# Patient Record
Sex: MALE | Race: WHITE | Employment: FULL TIME | URBAN - METROPOLITAN AREA
[De-identification: names, ages, dates, MRNs, and addresses within clinical notes are randomized per-mention and may not be internally consistent; named-entity substitution may affect disease eponyms.]

---

## 2017-01-04 ENCOUNTER — THERAPY VISIT (OUTPATIENT)
Dept: PHYSICAL THERAPY | Facility: CLINIC | Age: 47
End: 2017-01-04
Payer: COMMERCIAL

## 2017-01-04 DIAGNOSIS — M54.42 ACUTE LEFT-SIDED LOW BACK PAIN WITH LEFT-SIDED SCIATICA: Primary | ICD-10-CM

## 2017-01-04 PROCEDURE — 97110 THERAPEUTIC EXERCISES: CPT | Mod: GP | Performed by: PHYSICAL THERAPIST

## 2017-01-04 PROCEDURE — 97162 PT EVAL MOD COMPLEX 30 MIN: CPT | Mod: GP | Performed by: PHYSICAL THERAPIST

## 2017-01-04 PROCEDURE — 97112 NEUROMUSCULAR REEDUCATION: CPT | Mod: GP | Performed by: PHYSICAL THERAPIST

## 2017-01-04 ASSESSMENT — ACTIVITIES OF DAILY LIVING (ADL)
GOING_DOWN_1_FLIGHT_OF_STAIRS: SLIGHT DIFFICULTY
STEPPING_UP_AND_DOWN_CURBS: NO DIFFICULTY AT ALL
ROLLING_OVER_IN_BED: SLIGHT DIFFICULTY
HOS_ADL_ITEM_SCORE_TOTAL: 28
HOW_WOULD_YOU_RATE_YOUR_CURRENT_LEVEL_OF_FUNCTION_DURING_YOUR_USUAL_ACTIVITIES_OF_DAILY_LIVING_FROM_0_TO_100_WITH_100_BEING_YOUR_LEVEL_OF_FUNCTION_PRIOR_TO_YOUR_HIP_PROBLEM_AND_0_BEING_THE_INABILITY_TO_PERFORM_ANY_OF_YOUR_USUAL_DAILY_ACTIVITIES?: 40
PUTTING_ON_SOCKS_AND_SHOES: MODERATE DIFFICULTY
TWISTING/PIVOTING_ON_INVOLVED_LEG: MODERATE DIFFICULTY
GETTING_INTO_AND_OUT_OF_AN_AVERAGE_CAR: SLIGHT DIFFICULTY
WALKING_INITIALLY: SLIGHT DIFFICULTY
HOS_ADL_HIGHEST_POTENTIAL_SCORE: 40
STANDING_FOR_15_MINUTES: SLIGHT DIFFICULTY
RECREATIONAL_ACTIVITIES: EXTREME DIFFICULTY
LIGHT_TO_MODERATE_WORK: SLIGHT DIFFICULTY
SITTING_FOR_15_MINUTES: SLIGHT DIFFICULTY
HOS_ADL_COUNT: 10
GOING_UP_1_FLIGHT_OF_STAIRS: SLIGHT DIFFICULTY

## 2017-01-04 NOTE — PROGRESS NOTES
[facility] Initial Evaluation -- Lumbar    Date: January 4, 2017  Mukul Ascencio is a 46 year old male with a LB and L LE condition.   Referral: spine   Work Mechanical Stresses:  sitting  Employment Status:  Manager  Leisure Mechanical Stresses: normal daily activities, usually walks and lifts weights but has stopped since onset of pain  Functional disability score (ZULEMA/STarT Back):  50%/6  Visual Analog Score (VAS 0-10): 4/10  He is a formal exerciser    HISTORY:    Present symptoms: L LB, buttock, calf pain; numbness L lateral calf and foot into great toe  Pain quality (sharp/shooting/stabbing/aching/burning/cramping):  Sharp, aching   Paresthesia (yes/no):  Yes, R calf and big toe    Present since: 12/26/2016--noticed L LB soreness and tightness, woke with severe pain on 12/30/2016 extending into the L hip and LE to the calf, numbness to the big toe--had done the abdominal machine at the club the day before and wonders if it contributed.  MD gave him a steroid dose-pack which he finished today which may be helping but difficult to tell.  He has missed 1 day of work due to pain--has not been at work much due to other reasons but feels like he could get back to work now and make it through.  Symptoms (improving/unchanging/worsening):  improving.   Symptoms commenced as a result of: unknown   Condition occurred in the following environment:   home     Symptoms at onset (back/thigh/leg): L LB/buttock  Constant symptoms (back/thigh/leg): L calf and great toe numbness, L LB/buttock, thigh, and calf pain  Intermittent symptoms (back/thigh/leg): none    (With consideration for bending, sitting/rising, standing, walking, lying, am/as the day progresses/pm, when still/on the move)  Symptoms are made worse with the following: sitting 30 minutes, walking 1 mile, standing 30 minutes, bending, driving, lifting, dressing  Symptoms are made better with the following: lying on back with knees bent  up    Disturbed sleep (yes/no):  yes Sleeping postures (prone/sup/side R/L): supine    Previous episodes (0/1-5/6-10/11+): 1 Year of first episode: 2016    Previous history: episode of R LB and LE pain 05/2016  Previous treatments: PT for episode involving R side--nearly resolved       Specific Questions:  Cough/Sneeze/Strain (pos/neg): pos  Bowel/Bladder (normal/abnormal): normal  Gait (normal/abnormal): normal  Medications (nil/NSAIDS/analg/steroids/anticoag/other):  Steroids, anti-inflammatories, muscle relaxants, diabetes meds  Medical allergies:  penicillin  General Health (excellent/good/fair/poor):  good  Pertinent medical history:  Diabetes, sleep disorder/apnea  Imaging (NA/Xray/MRI):  MRI--central and L-sided disc protrusion L5-S1, interformainal disc protrusion L5-S1  Recent or major surgery (yes/no):  no  Night pain (yes/no): no  Accidents (yes/no): no  Unexplained weight loss (yes/no): no  Barriers at home: no  Other red flags: L DF weakness, great toe weakness    EXAMINATION    Posture:   Sitting (good/fair/poor): fair  Standing (good/fair/poor):good  Lordosis (red/acc/normal): red  Correction of posture (better/worse/no effect): increased L LBP/buttock pain, NE L calf    Lateral Shift (right/left/nil): nil  Relevant (yes/no):  na  Other Observations: na    Neurological:    Motor deficit:  L DF 4+/5, L great toe extension 3-/5  Reflexes:  Normal quad, absent Achilles on L  Sensory deficit:  Decreased L L4 and L5 light touch  Dural signs:  Positive L slump    Movement Loss:   Fer Mod Min Nil Pain   Flexion   x  x   Extension  x   x   Side Gliding R   x  NE   Side Gliding L  x   x     Test Movements:   During: produces, abolishes, increases, decreases, no effect, centralizing, peripheralizing   After: better, worse, no better, no worse, no effect, centralized, peripheralized    Pre-test Symptoms Standing:    Symptoms During Symptoms After ROM increased ROM decreased No Effect   FIS        Rep FIS         EIS        Rep EIS        Pre-test Symptoms Lying: L LB/buttock pain 4/10, L calf and great toe numbness    Symptoms During Symptoms After ROM increased ROM decreased No Effect   AUSTIN No Effect No Effect   x   Rep AUSTIN Decreases L LB/buttock No Better, decreased L ankle DF and great toe extension initially but returned to baseline after 30-60 seconds so no effect   x   EIL Increases No Worse   x   Rep EIL Decreases L LB/buttock (start over 2 pillows progressed to 0)  NE L calf or toe Better LB/buttock and  NE L calf or toe   x     If required Pre-test Symptoms: L LB/buttock pain 3/10, L calf and great toe numbness    Symptoms During Symptoms After ROM increased ROM decreased No Effect   SGIS - R        Rep SGIS - R        SGIS - L Increases No Worse   x   Rep SGIS - L Increases Better x       Static Tests:  Sitting slouched:    Sitting erect:    Standing slouched   Standing erect:    Lying prone in extension:   Long sitting:      Other Tests: improved L ankle AROM DF/strength after repeated L SGIS    Provisional Classification:  Derangement - Asymmetrical, unilateral, symptoms below knee, with relevant lateral component and Inconclusive/Other - Mechanically Unresponsive Radiculopathy    Principle of Management:  Education:  Posture--use of lumbar roll in sitting   Equipment provided:  Lumbar roll purchased  Mechanical therapy (Y/N):  y   Extension principle:    Lateral Principle:  L SGIS x10-20 reps, every 2 hours  Flexion principle:    Other:      ASSESSMENT/PLAN:    Patient is a 46 year old male with lumbar and L LE complaints.  Provisional classification of derangement with lateral component and assessing response to sidegliding exercises.  He had improved ROM and decreased L LB/buttock pain after L SGIS and also had slight improvement in L DF strength.  Trial of repeated lumbar extension in lying resulted in decreased L LB and buttock symptoms, no effect L calf or great toe, but improved lumbar flexion and  extension ROM.  Trial of repeated lumbar flexion in lying decreased L LB/buttock during, no effect L calf and great toe, NB and NE after, no change in ROM.  Treatment will focus on directional preference exercises and will require re-assessment to determine preference based on symptom response.  Will need to closely monitor L LE weakness for progression and referral back to MD if needed.   Patient has the following significant findings with corresponding treatment plan.                Diagnosis 1:  L LBP w/radiculopathy to foot  Pain -  self management, education, directional preference exercise and home program  Decreased ROM/flexibility - manual therapy, therapeutic exercise and home program  Decreased strength - therapeutic exercise, therapeutic activities and home program  Decreased function - therapeutic activities and home program  Impaired posture - neuro re-education and home program    Therapy Evaluation Codes:   1) History comprised of:   Personal factors that impact the plan of care:      None.    Comorbidity factors that impact the plan of care are:      Diabetes and Sleep disorder/apnea.     Medications impacting care: Anti-inflammatory, Muscle relaxant, Pain and Steroids.  2) Examination of Body Systems comprised of:   Body structures and functions that impact the plan of care:      Lumbar spine.   Activity limitations that impact the plan of care are:      Bending, Driving, Dressing, Lifting, Sitting, Standing and Walking.  3) Clinical presentation characteristics are:   Evolving/Changing.  4) Decision-Making    Moderate complexity using standardized patient assessment instrument and/or measureable assessment of functional outcome.  Cumulative Therapy Evaluation is: Moderate complexity.    Previous and current functional limitations:  (See Goal Flow Sheet for this information)    Short term and Long term goals: (See Goal Flow Sheet for this information)     Communication ability:  Patient appears to  be able to clearly communicate and understand verbal and written communication and follow directions correctly.  Treatment Explanation - The following has been discussed with the patient:   RX ordered/plan of care  Anticipated outcomes  Possible risks and side effects  This patient would benefit from PT intervention to resume normal activities.   Rehab potential is good.    Frequency:  2 X week, once daily  Duration:  for 2 weeks tapering to 1 X a week over 4 weeks  Discharge Plan:  Achieve all LTG.  Independent in home treatment program.  Reach maximal therapeutic benefit.    Please refer to the daily flowsheet for treatment today, total treatment time and time spent performing 1:1 timed codes.

## 2017-01-04 NOTE — Clinical Note
[facility] PHYSICAL THERAPY  [address] [place] [id number]  [phone]  2017  Re: Mukul Ascencio   :   1970  MRN:  [id number]   REFERRING PHYSICIAN:   Germain Santiago    [facility] PHYSICAL [facility]  Date of Initial Evaluation:  2017  Visits:  Rxs Used: 1  Reason for Referral:  Acute left-sided low back pain with left-sided sciatica    EVALUATION SUMMARY  [facility] Initial Evaluation -- Lumbar  Date: 2017  Mukul Ascencio is a 46 year old male with a LB and L LE condition.   Referral: spine   Work Mechanical Stresses:  sitting  Employment Status:  Manager  Leisure Mechanical Stresses: normal daily activities, usually walks and lifts weights but has stopped since onset of pain  Functional disability score (ZULEMA/STarT Back):  50%/6  Visual Analog Score (VAS 0-10): 4/10  He is a formal exerciser    HISTORY:  Present symptoms: L LB, buttock, calf pain; numbness L lateral calf and foot into great toe  Pain quality (sharp/shooting/stabbing/aching/burning/cramping):  Sharp, aching   Paresthesia (yes/no):  Yes, R calf and big toe  Present since: 2016--noticed L LB soreness and tightness, woke with severe pain on 2016 extending into the L hip and LE to the calf, numbness to the big toe--had done the abdominal machine at the club the day before and wonders if it contributed.  MD gave him a steroid dose-pack which he finished today which may be helping but difficult to tell.  He has missed 1 day of work due to pain--has not been at work much due to other reasons but feels like he could get back to work now and make it through.  Symptoms (improving/unchanging/worsening):  improving.   Symptoms commenced as a result of: unknown   Condition occurred in the following environment:   home   Symptoms at onset (back/thigh/leg): L LB/buttock  Constant symptoms (back/thigh/leg): L  calf and great toe numbness, L LB/buttock, thigh, and calf pain  Intermittent symptoms (back/thigh/leg): none  (With consideration for bending, sitting/rising, standing, walking, lying, am/as the day progresses/pm, when still/on the move)  Symptoms are made worse with the following: sitting 30 minutes, walking 1 mile, standing 30 minutes, bending, driving, lifting, dressing  Symptoms are made better with the following: lying on back with knees bent up  Disturbed sleep (yes/no):  yes Sleeping postures (prone/sup/side R/L): supine  Previous episodes (0/1-/-10/11+): 1 Year of first episode: 2016  Previous history: episode of R LB and LE pain 2016  Previous treatments: PT for episode involving R side--nearly resolved   Re: Mukul Ascencio   :   1970    Specific Questions:  Cough/Sneeze/Strain (pos/neg): pos  Bowel/Bladder (normal/abnormal): normal  Gait (normal/abnormal): normal  Medications (nil/NSAIDS/analg/steroids/anticoag/other):  Steroids, anti-inflammatories, muscle relaxants, diabetes meds  Medical allergies:  penicillin  General Health (excellent/good/fair/poor):  good  Pertinent medical history:  Diabetes, sleep disorder/apnea  Imaging (NA/Xray/MRI):  MRI--central and L-sided disc protrusion L5-S1, interformainal disc protrusion L5-S1  Recent or major surgery (yes/no):  no  Night pain (yes/no): no  Accidents (yes/no): no  Unexplained weight loss (yes/no): no  Barriers at home: no  Other red flags: L DF weakness, great toe weakness    EXAMINATION  Posture:   Sitting (good/fair/poor): fair  Standing (good/fair/poor):good  Lordosis (red/acc/normal): red  Correction of posture (better/worse/no effect): increased L LBP/buttock pain, NE L calf  Lateral Shift (right/left/nil): nil  Relevant (yes/no):  na  Other Observations: na  Neurological:  Motor deficit:  L DF 4+/5, L great toe extension 3-/5  Reflexes:  Normal quad, absent Achilles on L  Sensory deficit:  Decreased L L4 and L5 light touch  Dural signs:   Positive L slump    Movement Loss:   Fer Mod Min Nil Pain   Flexion   x  x   Extension  x   x   Side Gliding R   x  NE   Side Gliding L  x   x     Test Movements:   During: produces, abolishes, increases, decreases, no effect, centralizing, peripheralizing   After: better, worse, no better, no worse, no effect, centralized, peripheralized    Pre-test Symptoms Standing:    Symptoms During Symptoms After ROM increased ROM decreased No Effect   FIS        Rep FIS        EIS        Rep EIS            Re: Mukul Ascencio   :   1970    Pre-test Symptoms Lying: L LB/buttock pain 4/10, L calf and great toe numbness    Symptoms During Symptoms After ROM increased ROM decreased No Effect   AUSTIN No Effect No Effect   x   Rep AUSTIN Decreases L LB/buttock No Better   x   EIL Increases No Worse   x   Rep EIL Decreases L LB/buttock (start over 2 pillows progressed to 0)  NE L calf or toe Better LB/buttock and  NE L calf or toe   x     If required Pre-test Symptoms: L LB/buttock pain 3/10, L calf and great toe numbness    Symptoms During Symptoms After ROM increased ROM decreased No Effect   SGIS - R        Rep SGIS - R        SGIS - L Increases No Worse   x   Rep SGIS - L Increases Better x       Static Tests:  Sitting slouched:    Sitting erect:    Standing slouched   Standing erect:    Lying prone in extension:   Long sitting:    Other Tests: improved L ankle AROM DF/strength after repeated L SGIS  Provisional Classification:  Derangement - Asymmetrical, unilateral, symptoms below knee, with relevant lateral component and Inconclusive/Other - Mechanically Unresponsive Radiculopathy  Principle of Management:  Education:  Posture--use of lumbar roll in sitting   Equipment provided:  Lumbar roll purchased  Mechanical therapy (Y/N):  y   Extension principle:    Lateral Principle:  L SGIS x10-20 reps, every 2 hours  Flexion principle:    Other:      ASSESSMENT/PLAN:  Patient is a 46 year old male with lumbar and L LE complaints.   Provisional classification of derangement with lateral component and assessing response to sidegliding exercises.  He had improved ROM and decreased L LB/buttock pain after L SGIS and also had slight improvement in L DF strength.  Trial of repeated lumbar extension in lying resulted in decreased L LB and buttock symptoms, no effect L calf or great toe, but improved lumbar flexion and extension ROM.  Trial of repeated lumbar flexion in lying decreased L LB/buttock during, no effect L calf and great toe, NB and NE after, no change in ROM.  Treatment will focus on directional preference exercises and will require re-assessment to determine preference based on symptom response.  Will need to closely monitor L LE weakness for progression and referral back to MD if needed.   Patient has the following significant findings with corresponding treatment plan.                Diagnosis 1:  L LBP w/radiculopathy to foot  Pain -  self management, education, directional preference exercise and home program  Decreased ROM/flexibility - manual therapy, therapeutic exercise and home program  Decreased strength - therapeutic exercise, therapeutic activities and home program  Decreased function - therapeutic activities and home program  Impaired posture - neuro re-education and home program      Re: Mukul Ascencio   :   1970    Therapy Evaluation Codes:   1) History comprised of:   Personal factors that impact the plan of care:      None.    Comorbidity factors that impact the plan of care are:      Diabetes and Sleep disorder/apnea.     Medications impacting care: Anti-inflammatory, Muscle relaxant, Pain and Steroids.  2) Examination of Body Systems comprised of:   Body structures and functions that impact the plan of care:      Lumbar spine.   Activity limitations that impact the plan of care are:      Bending, Driving, Dressing, Lifting, Sitting, Standing and Walking.  3) Clinical presentation characteristics  are:   Evolving/Changing.  4) Decision-Making    Moderate complexity using standardized patient assessment instrument and/or measureable assessment of functional outcome.  Cumulative Therapy Evaluation is: Moderate complexity.    Previous and current functional limitations:  (See Goal Flow Sheet for this information)    Short term and Long term goals: (See Goal Flow Sheet for this information)   Communication ability:  Patient appears to be able to clearly communicate and understand verbal and written communication and follow directions correctly.  Treatment Explanation - The following has been discussed with the patient:   RX ordered/plan of care  Anticipated outcomes  Possible risks and side effects  This patient would benefit from PT intervention to resume normal activities.   Rehab potential is good.  Frequency:  2 X week, once daily  Duration:  for 2 weeks tapering to 1 X a week over 4 weeks  Discharge Plan:  Achieve all LTG.  Independent in home treatment program.  Reach maximal therapeutic benefit.      Thank you for your referral.    INQUIRIES  Therapist: Kajal Donaldson, PT  [facility] PHYSICAL THERAPY  [address] [address]  Phone: [phone]  Fax: [phone]

## 2017-01-05 ENCOUNTER — THERAPY VISIT (OUTPATIENT)
Dept: PHYSICAL THERAPY | Facility: CLINIC | Age: 47
End: 2017-01-05
Payer: COMMERCIAL

## 2017-01-05 DIAGNOSIS — M54.42 ACUTE LEFT-SIDED LOW BACK PAIN WITH LEFT-SIDED SCIATICA: Primary | ICD-10-CM

## 2017-01-05 PROCEDURE — 97530 THERAPEUTIC ACTIVITIES: CPT | Mod: GP | Performed by: PHYSICAL THERAPIST

## 2017-01-05 PROCEDURE — 97110 THERAPEUTIC EXERCISES: CPT | Mod: GP | Performed by: PHYSICAL THERAPIST

## 2017-01-09 ENCOUNTER — THERAPY VISIT (OUTPATIENT)
Dept: PHYSICAL THERAPY | Facility: CLINIC | Age: 47
End: 2017-01-09
Payer: COMMERCIAL

## 2017-01-09 DIAGNOSIS — M54.42 ACUTE LEFT-SIDED LOW BACK PAIN WITH LEFT-SIDED SCIATICA: Primary | ICD-10-CM

## 2017-01-09 PROCEDURE — 97530 THERAPEUTIC ACTIVITIES: CPT | Mod: GP | Performed by: PHYSICAL THERAPIST

## 2017-01-09 PROCEDURE — 97140 MANUAL THERAPY 1/> REGIONS: CPT | Mod: GP | Performed by: PHYSICAL THERAPIST

## 2017-01-09 PROCEDURE — 97110 THERAPEUTIC EXERCISES: CPT | Mod: GP | Performed by: PHYSICAL THERAPIST

## 2017-01-12 ENCOUNTER — THERAPY VISIT (OUTPATIENT)
Dept: PHYSICAL THERAPY | Facility: CLINIC | Age: 47
End: 2017-01-12
Payer: COMMERCIAL

## 2017-01-12 DIAGNOSIS — M54.42 ACUTE LEFT-SIDED LOW BACK PAIN WITH LEFT-SIDED SCIATICA: Primary | ICD-10-CM

## 2017-01-12 PROCEDURE — 97112 NEUROMUSCULAR REEDUCATION: CPT | Mod: GP | Performed by: PHYSICAL THERAPIST

## 2017-01-12 PROCEDURE — 97530 THERAPEUTIC ACTIVITIES: CPT | Mod: GP | Performed by: PHYSICAL THERAPIST

## 2017-01-12 PROCEDURE — 97110 THERAPEUTIC EXERCISES: CPT | Mod: GP | Performed by: PHYSICAL THERAPIST

## 2017-01-16 NOTE — PROGRESS NOTES
Subjective:                                       Pertinent medical history includes:  Diabetes, sleep disorder/apnea and other (numbness/tingling).  Medical allergies: yes ([place]).  Other surgeries include:  Other and orthopedic surgery (Knee, hernia).  Current medications:  Anti-inflammatory, pain medication, muscle relaxants, steroids and other (Diabetes).  Current occupation .    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            Oswestry Score: 50 %                 Objective:    System    Physical Exam    General     ROS    Assessment/Plan:

## 2017-01-26 ENCOUNTER — THERAPY VISIT (OUTPATIENT)
Dept: PHYSICAL THERAPY | Facility: CLINIC | Age: 47
End: 2017-01-26
Payer: COMMERCIAL

## 2017-01-26 DIAGNOSIS — M54.42 ACUTE LEFT-SIDED LOW BACK PAIN WITH LEFT-SIDED SCIATICA: Primary | ICD-10-CM

## 2017-01-26 PROCEDURE — 97110 THERAPEUTIC EXERCISES: CPT | Mod: GP | Performed by: PHYSICAL THERAPIST

## 2017-01-26 PROCEDURE — 97530 THERAPEUTIC ACTIVITIES: CPT | Mod: GP | Performed by: PHYSICAL THERAPIST

## 2017-01-26 NOTE — Clinical Note
"[facility] ATHLETIC [facility] PHYSICAL THERAPY  [address] [place] [id number] [phone]    2017    Re: Mukul Ascencio   :   1970  MRN:  [id number]   REFERRING PHYSICIAN:   Ed Cantu    [facility] ATHLETIC [facility] PHYSICAL [facility]  Date of Initial Evaluation:  2017  Visits: 5 Rxs Used: 5  Reason for Referral:  Acute left-sided low back pain with left-sided sciatica    Assessment/Plan:      DISCHARGE REPORT  Progress reporting period is from 2017 to 2017.       SUBJECTIVE  Subjective: Patient reports his LB and L hip are doing better and gradually continue to improve.  Pain is much less in these areas.  He still has numbness in the L lateral shin/calf and into the L foot.  Also still has weakness in L ankle dorsiflexion and great toe extension--thinks this is a little better but difficult to tell.  He has been able to walk 2.5 miles on the treadmill now, although, slower than he would normally walk.  He can sit for about 1 hour before getting uncomfortable.  He is sleeping better finally but still takes a muscle relaxer to help.      Current Pain level: 2/10.     Initial Pain level: 4/10.   Changes in function:  Yes, improved sitting, improved walking tolerance, improved sleep.  Adverse reaction to treatment or activity: None    OBJECTIVE  Objective: Lumbar AROM:  flexion WNL, produced mild L buttock \"soreness, tightness\"; extension WNL, NE; B SG WNL, NE.  L LE strength:  L ankle DF 4+/5, great toe extension 3-/5.  REIL w/self-OP--no effect during or after, no effect ROM.      ASSESSMENT/PLAN  Patient has been seen for 5 visits with treatment focusing on mechanical treatment of his LBP with radicular symptoms.  He did not have a definite response to repeated motions in any direction and appears to have a mechanically unresponsive radiculopathy that is gradually improving with time.  Lumbar AROM has improved and " is WNL with little effect on symptoms (flexion produces mild L buttock symptoms).  L ankle DF and great toe extension persists and remains the same objectively, however, patient subjectively reports slight improvement with this.  He has a good understanding of his plan of care moving forward and has exercises for maintenance and prevention.  He should see gradual improvement as he continues getting back to normal activities as tolerated as well as using extension exercises to prevent recurrence.   He will continue independently at this point.   Updated problem list and treatment plan: Diagnosis 1:  LBP w/radiculopathy to foot  Pain -  self management, education, directional preference exercise and home program  Decreased ROM/flexibility - home program  Decreased strength - home program  Decreased function - home program  Impaired posture - home program  Re: Mukul Ascencio   :   1970    STG/LTGs have been met or progress has been made towards goals:  Yes, goals for sitting have progressed but are unmet.  Goals for L ankle strength have not progressed and are unmet.  Assessment of Progress: The patient's condition is improving.  Self Management Plans:  Patient has been instructed in a home treatment program.  Patient is independent in a home treatment program.  Patient  has been instructed in self management of symptoms.  Patient is independent in self management of symptoms.  Mukul continues to require the following intervention to meet STG and LTG's:  PT intervention is no longer required to meet STG/LTG.    Recommendations:  This patient is ready to be discharged from therapy and continue their home treatment program.    Thank you for your referral.    INQUIRIES  Therapist:  Kajal Donaldson  DPT, Cert MDT  [facility] PHYSICAL THERAPY  [address] [place] [id number]  Phone: [phone]  Fax: [phone]

## 2017-01-27 NOTE — PROGRESS NOTES
"Subjective:    HPI                    Objective:    System    Physical Exam    General     ROS    Assessment/Plan:      DISCHARGE REPORT    Progress reporting period is from 01/04/2017 to 01/26/2017.       SUBJECTIVE  Subjective: Patient reports his LB and L hip are doing better and gradually continue to improve.  Pain is much less in these areas.  He still has numbness in the L lateral shin/calf and into the L foot.  Also still has weakness in L ankle dorsiflexion and great toe extension--thinks this is a little better but difficult to tell.  He has been able to walk 2.5 miles on the treadmill now, although, slower than he would normally walk.  He can sit for about 1 hour before getting uncomfortable.  He is sleeping better finally but still takes a muscle relaxer to help.      Current Pain level: 2/10.     Initial Pain level: 4/10.   Changes in function:  Yes, improved sitting, improved walking tolerance, improved sleep.  Adverse reaction to treatment or activity: None    OBJECTIVE  Objective: Lumbar AROM:  flexion WNL, produced mild L buttock \"soreness, tightness\"; extension WNL, NE; B SG WNL, NE.  L LE strength:  L ankle DF 4+/5, great toe extension 3-/5.  REIL w/self-OP--no effect during or after, no effect ROM.      ASSESSMENT/PLAN  Patient has been seen for 5 visits with treatment focusing on mechanical treatment of his LBP with radicular symptoms.  He did not have a definite response to repeated motions in any direction and appears to have a mechanically unresponsive radiculopathy that is gradually improving with time.  Lumbar AROM has improved and is WNL with little effect on symptoms (flexion produces mild L buttock symptoms).  L ankle DF and great toe extension persists and remains the same objectively, however, patient subjectively reports slight improvement with this.  He has a good understanding of his plan of care moving forward and has exercises for maintenance and prevention.  He should see gradual " improvement as he continues getting back to normal activities as tolerated as well as using extension exercises to prevent recurrence.   He will continue independently at this point.   Updated problem list and treatment plan: Diagnosis 1:  LBP w/radiculopathy to foot  Pain -  self management, education, directional preference exercise and home program  Decreased ROM/flexibility - home program  Decreased strength - home program  Decreased function - home program  Impaired posture - home program  STG/LTGs have been met or progress has been made towards goals:  Yes, goals for sitting have progressed but are unmet.  Goals for L ankle strength have not progressed and are unmet.  Assessment of Progress: The patient's condition is improving.  Self Management Plans:  Patient has been instructed in a home treatment program.  Patient is independent in a home treatment program.  Patient  has been instructed in self management of symptoms.  Patient is independent in self management of symptoms.  Mukul continues to require the following intervention to meet STG and LTG's:  PT intervention is no longer required to meet STG/LTG.    Recommendations:  This patient is ready to be discharged from therapy and continue their home treatment program.    Please refer to the daily flowsheet for treatment today, total treatment time and time spent performing 1:1 timed codes.

## 2017-02-21 ENCOUNTER — OFFICE VISIT (OUTPATIENT)
Dept: URGENT CARE | Facility: URGENT CARE | Age: 47
End: 2017-02-21
Payer: COMMERCIAL

## 2017-02-21 VITALS
DIASTOLIC BLOOD PRESSURE: 97 MMHG | HEART RATE: 93 BPM | TEMPERATURE: 98 F | WEIGHT: 222 LBS | SYSTOLIC BLOOD PRESSURE: 140 MMHG

## 2017-02-21 DIAGNOSIS — J01.90 ACUTE SINUSITIS WITH SYMPTOMS > 10 DAYS: Primary | ICD-10-CM

## 2017-02-21 PROCEDURE — 99203 OFFICE O/P NEW LOW 30 MIN: CPT | Performed by: FAMILY MEDICINE

## 2017-02-21 RX ORDER — CETIRIZINE HYDROCHLORIDE 10 MG/1
10 TABLET ORAL DAILY
COMMUNITY

## 2017-02-21 RX ORDER — DOXYCYCLINE 100 MG/1
100 CAPSULE ORAL 2 TIMES DAILY
Qty: 28 CAPSULE | Refills: 0 | Status: SHIPPED | OUTPATIENT
Start: 2017-02-21 | End: 2017-03-07

## 2017-02-21 RX ORDER — FLUTICASONE PROPIONATE 50 MCG
1 SPRAY, SUSPENSION (ML) NASAL DAILY
COMMUNITY

## 2017-02-21 NOTE — NURSING NOTE
Chief Complaint   Patient presents with     Sinus Problem     sinus drainage, nasal congestion, throat pain, slight cough and headache for 4 weeks.        Initial BP (!) 140/97 (BP Location: Left arm, Patient Position: Chair, Cuff Size: Adult Large)  Pulse 93  Temp 98  F (36.7  C) (Oral)  Wt 222 lb (100.7 kg) There is no height or weight on file to calculate BMI.  Medication Reconciliation: complete

## 2017-02-21 NOTE — PROGRESS NOTES
SUBJECTIVE:  Chief Complaint   Patient presents with     Sinus Problem     sinus drainage, nasal congestion, throat pain, slight cough and headache for 4 weeks.      Mukul Ascencio is a 46 year old male here with concerns about sinus infection.  He states onset of symptoms were 4 week(s) ago.  He has had maxillary pressure. Course of illness is same. Severity moderate  Current and Associated symptoms: rhinorrhea, cough , facial pain/pressure and headache  Predisposing factors include recent illness. Recent treatment has included: OTC meds. flonase    No past medical history on file.    ALLERGIES:  Penicillins      No current outpatient prescriptions on file prior to visit.  No current facility-administered medications on file prior to visit.     Social History   Substance Use Topics     Smoking status: Not on file     Smokeless tobacco: Not on file     Alcohol use Not on file       No family history on file.    ROS:  INTEGUMENTARY/SKIN: NEGATIVE for worrisome rashes, moles or lesions  EYES: NEGATIVE for vision changes or irritation  GI: NEGATIVE for nausea, abdominal pain, heartburn, or change in bowel habits    OBJECTIVE:  BP (!) 140/97 (BP Location: Left arm, Patient Position: Chair, Cuff Size: Adult Large)  Pulse 93  Temp 98  F (36.7  C) (Oral)  Wt 222 lb (100.7 kg)  Exam:GENERAL APPEARANCE: healthy, alert and no distress  EYES: EOMI,  PERRL, conjunctiva clear  HENT: ear canals and TM's normal.  Nose and mouth without ulcers, erythema or lesions  HENT: frontal sinus tenderness  and maxillary sinus tenderness   NECK: supple, nontender, no lymphadenopathy  RESP: lungs clear to auscultation - no rales, rhonchi or wheezes  CV: regular rates and rhythm, normal S1 S2, no murmur noted  NEURO: Normal strength and tone, sensory exam grossly normal,  normal speech and mentation  SKIN: no suspicious lesions or rashes    ASSESSMENT:  Acute sinusitis with symptoms > 10 days      - doxycycline (VIBRAMYCIN) 100 MG capsule; Take  1 capsule (100 mg) by mouth 2 times daily for 14 days     We discussed the primary importance of home cares to promote drainage and ventilation of the sinuses to decrease symptoms of sinus pressure and to eliminate infectious drainage from the sinuses.  I encouraged the use of saline nasal spray as needed to promote cleaning of the nasal passages and to promote drainage of the sinuses.  Allergy medications and steroid nasal spray help reduce swelling within the nasal tissue and may help open drainage/ ventilation passages to the sinuses.  Expectorants are recommended rather than decongestants to help promote sinus drainage.  Antibiotics are discussed as a secondary therapy for sinus infections that are unresponsive to home measures to promote sinus drainage and ventilation.     Follow up with primary clinic if not improving

## 2017-02-21 NOTE — PATIENT INSTRUCTIONS
Sinusitis (Antibiotic Treatment)    The sinuses are air-filled spaces within the bones of the face. They connect to the inside of the nose. Sinusitis is an inflammation of the tissue lining the sinus cavity. Sinus inflammation can occur during a cold. It can also be due to allergies to pollens and other particles in the air. Sinusitis can cause symptoms of sinus congestion and fullness. A sinus infection causes fever, headache and facial pain. There is often green or yellow drainage from the nose or into the back of the throat (post-nasal drip). You have been given antibiotics to treat this condition.  Home care:    Take the full course of antibiotics as instructed. Do not stop taking them, even if you feel better.    Drink plenty of water, hot tea, and other liquids. This may help thin mucus. It also may promote sinus drainage.    Heat may help soothe painful areas of the face. Use a towel soaked in hot water. Or,  the shower and direct the hot spray onto your face. Using a vaporizer along with a menthol rub at night may also help.     An expectorant containing guaifenesin may help thin the mucus and promote drainage from the sinuses.    Over-the-counter decongestants may be used unless a similar medicine was prescribed. Nasal sprays work the fastest. Use one that contains phenylephrine or oxymetazoline. First blow the nose gently. Then use the spray. Do not use these medicines more often than directed on the label or symptoms may get worse. You may also use tablets containing pseudoephedrine. Avoid products that combine ingredients, because side effects may be increased. Read labels. You can also ask the pharmacist for help. (NOTE: Persons with high blood pressure should not use decongestants. They can raise blood pressure.)    Over-the-counter antihistamines may help if allergies contributed to your sinusitis.      Do not use nasal rinses or irrigation during an acute sinus infection, unless told to by  your health care provider. Rinsing may spread the infection to other sinuses.    Use acetaminophen or ibuprofen to control pain, unless another pain medicine was prescribed. (If you have chronic liver or kidney disease or ever had a stomach ulcer, talk with your doctor before using these medicines. Aspirin should never be used in anyone under 18 years of age who is ill with a fever. It may cause severe liver damage.)    Don't smoke. This can worsen symptoms.  Follow-up care  Follow up with your healthcare provider or our staff if you are not improving within the next week.  When to seek medical advice  Call your healthcare provider if any of these occur:    Facial pain or headache becoming more severe    Stiff neck    Unusual drowsiness or confusion    Swelling of the forehead or eyelids    Vision problems, including blurred or double vision    Fever of 100.4 F (38 C) or higher, or as directed by your healthcare provider    Seizure    Breathing problems    Symptoms not resolving within 10 days    9177-0145 [employer]. [address] [place] [address], [place] [address]. All rights reserved. This information is not intended as a substitute for professional medical care. Always follow your healthcare professional's instructions.

## 2017-02-21 NOTE — MR AVS SNAPSHOT
After Visit Summary   2/21/2017    Mukul Ascencio    MRN: [id number]           Patient Information     Date Of Birth          1970        Visit Information        Provider Department      2/21/2017 12:30 PM Renate Baca MD [facility]        Today's Diagnoses     Acute sinusitis with symptoms > 10 days    -  1      Care Instructions      Sinusitis (Antibiotic Treatment)    The sinuses are air-filled spaces within the bones of the face. They connect to the inside of the nose. Sinusitis is an inflammation of the tissue lining the sinus cavity. Sinus inflammation can occur during a cold. It can also be due to allergies to pollens and other particles in the air. Sinusitis can cause symptoms of sinus congestion and fullness. A sinus infection causes fever, headache and facial pain. There is often green or yellow drainage from the nose or into the back of the throat (post-nasal drip). You have been given antibiotics to treat this condition.  Home care:    Take the full course of antibiotics as instructed. Do not stop taking them, even if you feel better.    Drink plenty of water, hot tea, and other liquids. This may help thin mucus. It also may promote sinus drainage.    Heat may help soothe painful areas of the face. Use a towel soaked in hot water. Or,  the shower and direct the hot spray onto your face. Using a vaporizer along with a menthol rub at night may also help.     An expectorant containing guaifenesin may help thin the mucus and promote drainage from the sinuses.    Over-the-counter decongestants may be used unless a similar medicine was prescribed. Nasal sprays work the fastest. Use one that contains phenylephrine or oxymetazoline. First blow the nose gently. Then use the spray. Do not use these medicines more often than directed on the label or symptoms may get worse. You may also use tablets containing pseudoephedrine. Avoid products that combine  ingredients, because side effects may be increased. Read labels. You can also ask the pharmacist for help. (NOTE: Persons with high blood pressure should not use decongestants. They can raise blood pressure.)    Over-the-counter antihistamines may help if allergies contributed to your sinusitis.      Do not use nasal rinses or irrigation during an acute sinus infection, unless told to by your health care provider. Rinsing may spread the infection to other sinuses.    Use acetaminophen or ibuprofen to control pain, unless another pain medicine was prescribed. (If you have chronic liver or kidney disease or ever had a stomach ulcer, talk with your doctor before using these medicines. Aspirin should never be used in anyone under 18 years of age who is ill with a fever. It may cause severe liver damage.)    Don't smoke. This can worsen symptoms.  Follow-up care  Follow up with your healthcare provider or our staff if you are not improving within the next week.  When to seek medical advice  Call your healthcare provider if any of these occur:    Facial pain or headache becoming more severe    Stiff neck    Unusual drowsiness or confusion    Swelling of the forehead or eyelids    Vision problems, including blurred or double vision    Fever of 100.4 F (38 C) or higher, or as directed by your healthcare provider    Seizure    Breathing problems    Symptoms not resolving within 10 days    [id number] [employer]. [address] [place] [address], [place] [address]. All rights reserved. This information is not intended as a substitute for professional medical care. Always follow your healthcare professional's instructions.              Follow-ups after your visit        Who to contact     If you have questions or need follow up information about today's clinic visit or your schedule please contact [facility] directly at [phone].  Normal or non-critical lab and imaging results will be  "communicated to you by [employer], letter or phone within 4 business days after the clinic has received the results. If you do not hear from us within 7 days, please contact the clinic through [employer] or phone. If you have a critical or abnormal lab result, we will notify you by phone as soon as possible.  Submit refill requests through [employer] or call your pharmacy and they will forward the refill request to us. Please allow 3 business days for your refill to be completed.          Additional Information About Your Visit        Bit[employer] Information     Bit[employer] lets you send messages to your doctor, view your test results, renew your prescriptions, schedule appointments and more. To sign up, go to [email or web address] . Click on \"Log in\" on the left side of the screen, which will take you to the Welcome page. Then click on \"Sign up Now\" on the right side of the page.     You will be asked to enter the access code listed below, as well as some personal information. Please follow the directions to create your username and password.     Your access code is: [email or web address]  Expires: 2017  1:00 PM     Your access code will  in 90 days. If you need help or a new code, please call your [facility] clinic or [phone].        Care EveryWhere ID     This is your Care EveryWhere ID. This could be used by other organizations to access your [facility] medical records  [id number]        Your Vitals Were     Pulse Temperature                93 98  F (36.7  C) (Oral)           Blood Pressure from Last 3 Encounters:   17 (!) 140/97    Weight from Last 3 Encounters:   17 222 lb (100.7 kg)              Today, you had the following     No orders found for display         Today's Medication Changes          These changes are accurate as of: 17  1:01 PM.  If you have any questions, ask your nurse or doctor.               Start taking these medicines.        Dose/Directions    doxycycline 100 MG capsule "   Commonly known as:  VIBRAMYCIN   Used for:  Acute sinusitis with symptoms > 10 days        Dose:  100 mg   Take 1 capsule (100 mg) by mouth 2 times daily for 14 days   Quantity:  28 capsule   Refills:  0            Where to get your medicines      These medications were sent to [facility] [place], [place] - [address].  [address] [address]     Phone:  [phone]     doxycycline 100 MG capsule                Primary Care Provider    None Specified       No primary provider on file.        Thank you!     Thank you for choosing [facility]  for your care. Our goal is always to provide you with excellent care. Hearing back from our patients is one way we can continue to improve our services. Please take a few minutes to complete the written survey that you may receive in the mail after your visit with us. Thank you!             Your Updated Medication List - Protect others around you: Learn how to safely use, store and throw away your medicines at www.disposemymeds.org.          This list is accurate as of: 2/21/17  1:01 PM.  Always use your most recent med list.                   Brand Name Dispense Instructions for use    aspirin 81 MG tablet      Take 81 mg by mouth daily       cetirizine 10 MG tablet    zyrTEC     Take 10 mg by mouth daily       doxycycline 100 MG capsule    VIBRAMYCIN    28 capsule    Take 1 capsule (100 mg) by mouth 2 times daily for 14 days       fluticasone 50 MCG/ACT spray    FLONASE     Spray 1 spray into both nostrils daily       GLIMEPIRIDE PO      Take 0.5 mg by mouth daily (with breakfast)       LIPITOR PO      Take 20 mg by mouth daily       metFORMIN 500 MG tablet    GLUCOPHAGE     Take 500 mg by mouth 4 times daily       MUCINEX SINUS-MAX DAY/NIGHT PO      Take by mouth as needed

## 2018-01-24 ENCOUNTER — OFFICE VISIT (OUTPATIENT)
Dept: URGENT CARE | Facility: URGENT CARE | Age: 48
End: 2018-01-24
Payer: COMMERCIAL

## 2018-01-24 VITALS
OXYGEN SATURATION: 99 % | DIASTOLIC BLOOD PRESSURE: 87 MMHG | WEIGHT: 224.31 LBS | RESPIRATION RATE: 18 BRPM | SYSTOLIC BLOOD PRESSURE: 138 MMHG | TEMPERATURE: 97.1 F | HEART RATE: 73 BPM

## 2018-01-24 DIAGNOSIS — R06.2 WHEEZING: ICD-10-CM

## 2018-01-24 DIAGNOSIS — R05.8 PRODUCTIVE COUGH: Primary | ICD-10-CM

## 2018-01-24 PROCEDURE — 94640 AIRWAY INHALATION TREATMENT: CPT | Performed by: PHYSICIAN ASSISTANT

## 2018-01-24 PROCEDURE — 99214 OFFICE O/P EST MOD 30 MIN: CPT | Mod: 25 | Performed by: PHYSICIAN ASSISTANT

## 2018-01-24 RX ORDER — AZITHROMYCIN 250 MG/1
TABLET, FILM COATED ORAL
Qty: 6 TABLET | Refills: 0 | Status: SHIPPED | OUTPATIENT
Start: 2018-01-24 | End: 2020-02-12

## 2018-01-24 RX ORDER — ALBUTEROL SULFATE 90 UG/1
2 AEROSOL, METERED RESPIRATORY (INHALATION) EVERY 6 HOURS PRN
Qty: 1 INHALER | Refills: 0 | Status: SHIPPED | OUTPATIENT
Start: 2018-01-24

## 2018-01-24 RX ORDER — ALBUTEROL SULFATE 0.83 MG/ML
SOLUTION RESPIRATORY (INHALATION)
Qty: 1 VIAL | Refills: 0
Start: 2018-01-24

## 2018-01-24 RX ORDER — IBUPROFEN 200 MG
400 TABLET ORAL EVERY 4 HOURS PRN
COMMUNITY

## 2018-01-24 RX ORDER — CODEINE PHOSPHATE AND GUAIFENESIN 10; 100 MG/5ML; MG/5ML
1 SOLUTION ORAL EVERY 4 HOURS PRN
Qty: 120 ML | Refills: 0 | Status: SHIPPED | OUTPATIENT
Start: 2018-01-24 | End: 2020-02-12

## 2018-01-24 NOTE — MR AVS SNAPSHOT
"              After Visit Summary   1/24/2018    Mukul Ascencio    MRN: [id number]           Patient Information     Date Of Birth          1970        Visit Information        Provider Department      1/24/2018 5:25 PM Marcie Charles PA-C [facility]        Today's Diagnoses     Productive cough    -  1    Wheezing          Care Instructions    (R05) Productive cough  (primary encounter diagnosis)  Comment:   Plan: azithromycin (ZITHROMAX) 250 MG tablet,         guaiFENesin-codeine (ROBITUSSIN AC) 100-10         MG/5ML SOLN solution            (R06.2) Wheezing  Comment:   Plan: INHALATION/NEBULIZER TREATMENT, INITIAL,         albuterol (2.5 MG/3ML) 0.083% neb solution,         albuterol (PROAIR HFA/PROVENTIL HFA/VENTOLIN         HFA) 108 (90 BASE) MCG/ACT Inhaler          Follow up with primary clinic should symptoms persist or worsen.                Follow-ups after your visit        Who to contact     If you have questions or need follow up information about today's clinic visit or your schedule please contact [facility] directly at [phone].  Normal or non-critical lab and imaging results will be communicated to you by [employer], letter or phone within 4 business days after the clinic has received the results. If you do not hear from us within 7 days, please contact the clinic through [employer] or phone. If you have a critical or abnormal lab result, we will notify you by phone as soon as possible.  Submit refill requests through [employer] or call your pharmacy and they will forward the refill request to us. Please allow 3 business days for your refill to be completed.          Additional Information About Your Visit        MyChart Information     [employer] lets you send messages to your doctor, view your test results, renew your prescriptions, schedule appointments and more. To sign up, go to [email or web address] . Click on \"Log in\" on the " "left side of the screen, which will take you to the Welcome page. Then click on \"Sign up Now\" on the right side of the page.     You will be asked to enter the access code listed below, as well as some personal information. Please follow the directions to create your username and password.     Your access code is: [email or web address]  Expires: 2018  7:45 PM     Your access code will  in 90 days. If you need help or a new code, please call your [facility] clinic or [phone].        Care EveryWhere ID     This is your Care EveryWhere ID. This could be used by other organizations to access your [facility] medical records  [id number]        Your Vitals Were     Pulse Temperature Respirations Pulse Oximetry          73 97.1  F (36.2  C) (Oral) 18 99%         Blood Pressure from Last 3 Encounters:   18 138/87   17 (!) 140/97    Weight from Last 3 Encounters:   18 224 lb 5 oz (101.7 kg)   17 222 lb (100.7 kg)              We Performed the Following     INHALATION/NEBULIZER TREATMENT, INITIAL          Today's Medication Changes          These changes are accurate as of 18  7:45 PM.  If you have any questions, ask your nurse or doctor.               Start taking these medicines.        Dose/Directions    * albuterol (2.5 MG/3ML) 0.083% neb solution   Used for:  Wheezing   Started by:  Marcie Charles PA-C        In clinic   Quantity:  1 vial   Refills:  0       * albuterol 108 (90 BASE) MCG/ACT Inhaler   Commonly known as:  PROAIR HFA/PROVENTIL HFA/VENTOLIN HFA   Used for:  Wheezing   Started by:  Marcie Charles PA-C        Dose:  2 puff   Inhale 2 puffs into the lungs every 6 hours as needed for shortness of breath / dyspnea or wheezing   Quantity:  1 Inhaler   Refills:  0       azithromycin 250 MG tablet   Commonly known as:  ZITHROMAX   Used for:  Productive cough   Started by:  Marcie Charles PA-C        Two tablets first day, then one tablet daily for " four days.   Quantity:  6 tablet   Refills:  0       guaiFENesin-codeine 100-10 MG/5ML Soln solution   Commonly known as:  ROBITUSSIN AC   Used for:  Productive cough   Started by:  Marcie Charles PA-C        Dose:  1 tsp.   Take 5 mLs by mouth every 4 hours as needed for cough   Quantity:  120 mL   Refills:  0       * Notice:  This list has 2 medication(s) that are the same as other medications prescribed for you. Read the directions carefully, and ask your doctor or other care provider to review them with you.         Where to get your medicines      These medications were sent to [facility] [address] [address]     Phone:  [phone]     albuterol 108 (90 BASE) MCG/ACT Inhaler    azithromycin 250 MG tablet         Some of these will need a paper prescription and others can be bought over the counter.  Ask your nurse if you have questions.     Bring a paper prescription for each of these medications     guaiFENesin-codeine 100-10 MG/5ML Soln solution       You don't need a prescription for these medications     albuterol (2.5 MG/3ML) 0.083% neb solution                Primary Care Provider Office Phone # Fax #    [facility] [phone] [address] [facility] [address]        Equal Access to Services     ASAD RAMÍREZ AH: Hadii tayler macias hadasho Sokeysha, waaxda luqadaha, qaybta kaalmada ademukundyada, juan diego larios. So [facility] [place] [phone].    ATENCIÓN: Si habla español, tiene a goff disposición servicios gratuitos de asistencia lingüística. Nas al [phone].    We comply with applicable federal civil rights laws and [place] laws. We do not discriminate on the basis of race, color, national origin, age, disability, sex, sexual orientation, or gender identity.            Thank you!     Thank you for choosing [facility]  for your care.  Our goal is always to provide you with excellent care. Hearing back from our patients is one way we can continue to improve our services. Please take a few minutes to complete the written survey that you may receive in the mail after your visit with us. Thank you!             Your Updated Medication List - Protect others around you: Learn how to safely use, store and throw away your medicines at www.disposemymeds.org.          This list is accurate as of 1/24/18  7:45 PM.  Always use your most recent med list.                   Brand Name Dispense Instructions for use Diagnosis    * albuterol (2.5 MG/3ML) 0.083% neb solution     1 vial    In clinic    Wheezing       * albuterol 108 (90 BASE) MCG/ACT Inhaler    PROAIR HFA/PROVENTIL HFA/VENTOLIN HFA    1 Inhaler    Inhale 2 puffs into the lungs every 6 hours as needed for shortness of breath / dyspnea or wheezing    Wheezing       aspirin 81 MG tablet      Take 81 mg by mouth daily        azithromycin 250 MG tablet    ZITHROMAX    6 tablet    Two tablets first day, then one tablet daily for four days.    Productive cough       cetirizine 10 MG tablet    zyrTEC     Take 10 mg by mouth daily        fluticasone 50 MCG/ACT spray    FLONASE     Spray 1 spray into both nostrils daily        GLIMEPIRIDE PO      Take 1 mg by mouth daily (with breakfast)        guaiFENesin-codeine 100-10 MG/5ML Soln solution    ROBITUSSIN AC    120 mL    Take 5 mLs by mouth every 4 hours as needed for cough    Productive cough       ibuprofen 200 MG tablet    ADVIL/MOTRIN     Take 400 mg by mouth every 4 hours as needed for mild pain        LIPITOR PO      Take 20 mg by mouth daily        metFORMIN 500 MG tablet    GLUCOPHAGE     Take 500 mg by mouth 4 times daily        MUCINEX SINUS-MAX DAY/NIGHT PO      Take by mouth as needed        * Notice:  This list has 2 medication(s) that are the same as other medications prescribed for you. Read the directions carefully, and ask your doctor or other  care provider to review them with you.

## 2018-01-25 NOTE — PATIENT INSTRUCTIONS
(R05) Productive cough  (primary encounter diagnosis)  Comment:   Plan: azithromycin (ZITHROMAX) 250 MG tablet,         guaiFENesin-codeine (ROBITUSSIN AC) 100-10         MG/5ML SOLN solution            (R06.2) Wheezing  Comment:   Plan: INHALATION/NEBULIZER TREATMENT, INITIAL,         albuterol (2.5 MG/3ML) 0.083% neb solution,         albuterol (PROAIR HFA/PROVENTIL HFA/VENTOLIN         HFA) 108 (90 BASE) MCG/ACT Inhaler          Follow up with primary clinic should symptoms persist or worsen.

## 2018-01-25 NOTE — PROGRESS NOTES
SUBJECTIVE:   Mukul Ascencio is a 47 year old male presenting with a chief complaint of   1) productive cough for the past week, worsening in the past few days.   2) low grade fevers.  3) wheezy/burning feeling in chest  Onset of symptoms was as above  Course of illness is worsening.    Severity moderate  Current and Associated symptoms: as above  Treatment measures tried include otc meds.  Predisposing factors include DID have a flu vaccination this season.    No past medical history on file.  Patient Active Problem List   Diagnosis     Lumbar radiculopathy     Acute left-sided low back pain with left-sided sciatica     Social History   Substance Use Topics     Smoking status: Never Smoker     Smokeless tobacco: Never Used     Alcohol use Not on file       ROS:  CONSTITUTIONAL:as per HPI  INTEGUMENTARY/SKIN: NEGATIVE for worrisome rashes, moles or lesions  EYES: NEGATIVE for vision changes or irritation  ENT/MOUTH: as per HPI  RESP:as per HPI  CV: NEGATIVE for chest pain, palpitations or peripheral edema  GI: NEGATIVE for nausea, abdominal pain, heartburn, or change in bowel habits  MUSCULOSKELETAL: NEGATIVE for significant arthralgias or myalgia    OBJECTIVE  :/87  Pulse 73  Temp 97.1  F (36.2  C) (Oral)  Resp 18  Wt 224 lb 5 oz (101.7 kg)  SpO2 99%  GENERAL APPEARANCE: healthy, alert and no distress  EYES: EOMI,  PERRL, conjunctiva clear  HENT: ear canals and TM's normal.  Nose and mouth without ulcers, erythema or lesions  NECK: supple, nontender, no lymphadenopathy  RESP: wheezing throughout which clears with neb in clinic  CV: regular rates and rhythm, normal S1 S2, no murmur noted  ABDOMEN:  soft, nontender, no HSM or masses and bowel sounds normal  NEURO: Normal strength and tone, sensory exam grossly normal,  normal speech and mentation  SKIN: no suspicious lesions or rashes    (R05) Productive cough  (primary encounter diagnosis)  Comment:   Plan: azithromycin (ZITHROMAX) 250 MG tablet,          guaiFENesin-codeine (ROBITUSSIN AC) 100-10         MG/5ML SOLN solution            (R06.2) Wheezing  Comment:   Plan: INHALATION/NEBULIZER TREATMENT, INITIAL,         albuterol (2.5 MG/3ML) 0.083% neb solution,         albuterol (PROAIR HFA/PROVENTIL HFA/VENTOLIN         HFA) 108 (90 BASE) MCG/ACT Inhaler          Follow up with primary clinic should symptoms persist or worsen.      Patient expresses understanding and agreement with the assessment and plan as above.

## 2019-05-25 ENCOUNTER — APPOINTMENT (OUTPATIENT)
Dept: CT IMAGING | Facility: CLINIC | Age: 49
End: 2019-05-25
Attending: EMERGENCY MEDICINE
Payer: COMMERCIAL

## 2019-05-25 ENCOUNTER — HOSPITAL ENCOUNTER (EMERGENCY)
Facility: CLINIC | Age: 49
Discharge: HOME | End: 2019-05-25
Attending: EMERGENCY MEDICINE | Admitting: EMERGENCY MEDICINE
Payer: COMMERCIAL

## 2019-05-25 VITALS
DIASTOLIC BLOOD PRESSURE: 80 MMHG | HEART RATE: 82 BPM | TEMPERATURE: 96.2 F | WEIGHT: 215 LBS | RESPIRATION RATE: 14 BRPM | OXYGEN SATURATION: 96 % | SYSTOLIC BLOOD PRESSURE: 121 MMHG

## 2019-05-25 DIAGNOSIS — N20.0 KIDNEY STONE: ICD-10-CM

## 2019-05-25 DIAGNOSIS — R80.9 MICROALBUMINURIA: ICD-10-CM

## 2019-05-25 LAB
ALBUMIN UR-MCNC: NEGATIVE MG/DL
ANION GAP SERPL CALCULATED.3IONS-SCNC: 8 MMOL/L (ref 3–14)
APPEARANCE UR: CLEAR
BASOPHILS # BLD AUTO: 0 10E9/L (ref 0–0.2)
BASOPHILS NFR BLD AUTO: 0.2 %
BILIRUB UR QL STRIP: NEGATIVE
BUN SERPL-MCNC: 21 MG/DL (ref 7–30)
CALCIUM SERPL-MCNC: 9.2 MG/DL (ref 8.5–10.1)
CHLORIDE SERPL-SCNC: 99 MMOL/L (ref 94–109)
CO2 SERPL-SCNC: 27 MMOL/L (ref 20–32)
COLOR UR AUTO: YELLOW
CREAT SERPL-MCNC: 1.49 MG/DL (ref 0.66–1.25)
DIFFERENTIAL METHOD BLD: ABNORMAL
EOSINOPHIL # BLD AUTO: 0.1 10E9/L (ref 0–0.7)
EOSINOPHIL NFR BLD AUTO: 0.5 %
ERYTHROCYTE [DISTWIDTH] IN BLOOD BY AUTOMATED COUNT: 11.9 % (ref 10–15)
GFR SERPL CREATININE-BSD FRML MDRD: 54 ML/MIN/{1.73_M2}
GLUCOSE SERPL-MCNC: 279 MG/DL (ref 70–99)
GLUCOSE UR STRIP-MCNC: 300 MG/DL
HCT VFR BLD AUTO: 41.1 % (ref 40–53)
HGB BLD-MCNC: 14.7 G/DL (ref 13.3–17.7)
HGB UR QL STRIP: ABNORMAL
IMM GRANULOCYTES # BLD: 0 10E9/L (ref 0–0.4)
IMM GRANULOCYTES NFR BLD: 0.4 %
KETONES UR STRIP-MCNC: NEGATIVE MG/DL
LEUKOCYTE ESTERASE UR QL STRIP: NEGATIVE
LYMPHOCYTES # BLD AUTO: 1.3 10E9/L (ref 0.8–5.3)
LYMPHOCYTES NFR BLD AUTO: 12.3 %
MCH RBC QN AUTO: 30.6 PG (ref 26.5–33)
MCHC RBC AUTO-ENTMCNC: 35.8 G/DL (ref 31.5–36.5)
MCV RBC AUTO: 85 FL (ref 78–100)
MONOCYTES # BLD AUTO: 0.5 10E9/L (ref 0–1.3)
MONOCYTES NFR BLD AUTO: 4.7 %
MUCOUS THREADS #/AREA URNS LPF: PRESENT /LPF
NEUTROPHILS # BLD AUTO: 8.8 10E9/L (ref 1.6–8.3)
NEUTROPHILS NFR BLD AUTO: 81.9 %
NITRATE UR QL: NEGATIVE
NRBC # BLD AUTO: 0 10*3/UL
NRBC BLD AUTO-RTO: 0 /100
PH UR STRIP: 6.5 PH (ref 5–7)
PLATELET # BLD AUTO: 219 10E9/L (ref 150–450)
POTASSIUM SERPL-SCNC: 4.2 MMOL/L (ref 3.4–5.3)
RBC # BLD AUTO: 4.81 10E12/L (ref 4.4–5.9)
RBC #/AREA URNS AUTO: 78 /HPF (ref 0–2)
SODIUM SERPL-SCNC: 134 MMOL/L (ref 133–144)
SOURCE: ABNORMAL
SP GR UR STRIP: 1.02 (ref 1–1.03)
SPERM #/AREA URNS HPF: PRESENT /HPF
UROBILINOGEN UR STRIP-MCNC: NORMAL MG/DL (ref 0–2)
WBC # BLD AUTO: 10.7 10E9/L (ref 4–11)
WBC #/AREA URNS AUTO: 5 /HPF (ref 0–5)

## 2019-05-25 PROCEDURE — 25000128 H RX IP 250 OP 636

## 2019-05-25 PROCEDURE — 25000128 H RX IP 250 OP 636: Performed by: EMERGENCY MEDICINE

## 2019-05-25 PROCEDURE — 99285 EMERGENCY DEPT VISIT HI MDM: CPT | Mod: 25

## 2019-05-25 PROCEDURE — 80048 BASIC METABOLIC PNL TOTAL CA: CPT | Performed by: EMERGENCY MEDICINE

## 2019-05-25 PROCEDURE — 85025 COMPLETE CBC W/AUTO DIFF WBC: CPT | Performed by: EMERGENCY MEDICINE

## 2019-05-25 PROCEDURE — 96361 HYDRATE IV INFUSION ADD-ON: CPT

## 2019-05-25 PROCEDURE — 74176 CT ABD & PELVIS W/O CONTRAST: CPT

## 2019-05-25 PROCEDURE — 96374 THER/PROPH/DIAG INJ IV PUSH: CPT

## 2019-05-25 PROCEDURE — 25000132 ZZH RX MED GY IP 250 OP 250 PS 637: Performed by: EMERGENCY MEDICINE

## 2019-05-25 PROCEDURE — 96375 TX/PRO/DX INJ NEW DRUG ADDON: CPT

## 2019-05-25 PROCEDURE — 81001 URINALYSIS AUTO W/SCOPE: CPT | Performed by: EMERGENCY MEDICINE

## 2019-05-25 RX ORDER — HYDROMORPHONE HYDROCHLORIDE 1 MG/ML
INJECTION, SOLUTION INTRAMUSCULAR; INTRAVENOUS; SUBCUTANEOUS
Status: COMPLETED
Start: 2019-05-25 | End: 2019-05-25

## 2019-05-25 RX ORDER — TAMSULOSIN HYDROCHLORIDE 0.4 MG/1
0.4 CAPSULE ORAL DAILY
Qty: 7 CAPSULE | Refills: 0 | Status: SHIPPED | OUTPATIENT
Start: 2019-05-25 | End: 2019-05-28

## 2019-05-25 RX ORDER — KETOROLAC TROMETHAMINE 30 MG/ML
30 INJECTION, SOLUTION INTRAMUSCULAR; INTRAVENOUS ONCE
Status: COMPLETED | OUTPATIENT
Start: 2019-05-25 | End: 2019-05-25

## 2019-05-25 RX ORDER — HYDROMORPHONE HYDROCHLORIDE 1 MG/ML
0.5 INJECTION, SOLUTION INTRAMUSCULAR; INTRAVENOUS; SUBCUTANEOUS
Status: DISCONTINUED | OUTPATIENT
Start: 2019-05-25 | End: 2019-05-25 | Stop reason: HOSPADM

## 2019-05-25 RX ORDER — ONDANSETRON 2 MG/ML
4 INJECTION INTRAMUSCULAR; INTRAVENOUS ONCE
Status: COMPLETED | OUTPATIENT
Start: 2019-05-25 | End: 2019-05-25

## 2019-05-25 RX ORDER — HYDROCODONE BITARTRATE AND ACETAMINOPHEN 5; 325 MG/1; MG/1
1 TABLET ORAL ONCE
Status: COMPLETED | OUTPATIENT
Start: 2019-05-25 | End: 2019-05-25

## 2019-05-25 RX ORDER — HYDROMORPHONE HYDROCHLORIDE 1 MG/ML
INJECTION, SOLUTION INTRAMUSCULAR; INTRAVENOUS; SUBCUTANEOUS
Status: DISCONTINUED
Start: 2019-05-25 | End: 2019-05-25 | Stop reason: HOSPADM

## 2019-05-25 RX ORDER — ONDANSETRON 4 MG/1
4 TABLET, ORALLY DISINTEGRATING ORAL EVERY 8 HOURS PRN
Qty: 10 TABLET | Refills: 0 | Status: SHIPPED | OUTPATIENT
Start: 2019-05-25 | End: 2019-05-28

## 2019-05-25 RX ORDER — ONDANSETRON 2 MG/ML
INJECTION INTRAMUSCULAR; INTRAVENOUS
Status: COMPLETED
Start: 2019-05-25 | End: 2019-05-25

## 2019-05-25 RX ORDER — HYDROCODONE BITARTRATE AND ACETAMINOPHEN 5; 325 MG/1; MG/1
1 TABLET ORAL EVERY 6 HOURS PRN
Qty: 15 TABLET | Refills: 0 | Status: SHIPPED | OUTPATIENT
Start: 2019-05-25 | End: 2019-05-28 | Stop reason: ALTCHOICE

## 2019-05-25 RX ADMIN — KETOROLAC TROMETHAMINE 30 MG: 30 INJECTION, SOLUTION INTRAMUSCULAR at 10:59

## 2019-05-25 RX ADMIN — Medication 1 MG: at 10:49

## 2019-05-25 RX ADMIN — HYDROMORPHONE HYDROCHLORIDE 1 MG: 1 INJECTION, SOLUTION INTRAMUSCULAR; INTRAVENOUS; SUBCUTANEOUS at 10:49

## 2019-05-25 RX ADMIN — ONDANSETRON 4 MG: 2 INJECTION INTRAMUSCULAR; INTRAVENOUS at 10:49

## 2019-05-25 RX ADMIN — SODIUM CHLORIDE 1000 ML: 9 INJECTION, SOLUTION INTRAVENOUS at 10:53

## 2019-05-25 RX ADMIN — HYDROMORPHONE HYDROCHLORIDE 0.5 MG: 1 INJECTION, SOLUTION INTRAMUSCULAR; INTRAVENOUS; SUBCUTANEOUS at 11:00

## 2019-05-25 RX ADMIN — HYDROCODONE BITARTRATE AND ACETAMINOPHEN 1 TABLET: 5; 325 TABLET ORAL at 13:18

## 2019-05-25 ASSESSMENT — ENCOUNTER SYMPTOMS
VOMITING: 1
HEMATURIA: 0
SHORTNESS OF BREATH: 0
ABDOMINAL PAIN: 1
NAUSEA: 1
DYSURIA: 0
DIARRHEA: 0
FEVER: 0
BACK PAIN: 1

## 2019-05-25 NOTE — ED AVS SNAPSHOT
Emergency Department  [address] [place] [address]  Phone:  [phone]  Fax:  [phone]                                    Mukul Ascencio   MRN: [id number]    Department:   Emergency Department   Date of Visit:  5/25/2019           After Visit Summary Signature Page    I have received my discharge instructions, and my questions have been answered. I have discussed any challenges I see with this plan with the nurse or doctor.    ..........................................................................................................................................  Patient/Patient Representative Signature      ..........................................................................................................................................  Patient Representative Print Name and Relationship to Patient    ..................................................               ................................................  Date                                   Time    ..........................................................................................................................................  Reviewed by Signature/Title    ...................................................              ..............................................  Date                                               Time          22EPIC Rev 08/18

## 2019-05-25 NOTE — ED PROVIDER NOTES
History     Chief Complaint:  Abdominal Pain    HPI:   The history is provided by the patient.      Mukul Ascencio is a 49 year old male with history of type 2 diabetes, hypercholesterolemia, and kidney stones who presents with abdominal pain. The patient states that 4 hours ago, he woke up with lower left back pain that has gradually exacerbated and now radiates into his left lower abdomen. He states that he has felt nauseous and has vomited several times. The patient states that this pain feels similar to his past kidney stones. He has not required lithotripsy for his kidney stones in the past. The patient denies hematuria, urethral discharge, fevers, or chest pain.     Allergies:  Cefprozil  Penicillins      Medications:    Aspirin 81 mg  Lipitor   Zyrtec  Wellbutrin  Revatio  Prozac  Fluticasone   Amaryl     Past Medical History:    Type 2 diabetes   PAUL  Pure hypercholesteremia   Calculus of kidney     Past Surgical History:    Rotator cuff repair right  Inguinal hernia repair  Knee arthroscopy     Family History:    Hyperlipidemia: brother  Heart disease: Father  67 y.o (mi (first late 30's) dm, htn, chol, benign thyroid mass )    Social History:  The patient is accompanied to the ED by wife  [facility]   Marital Status:    Smoking status: Never  Alcohol use: Positive, rare use     Review of Systems   Constitutional: Negative for fever.   Respiratory: Negative for shortness of breath.    Cardiovascular: Negative for chest pain.   Gastrointestinal: Positive for abdominal pain, nausea and vomiting. Negative for diarrhea.   Genitourinary: Negative for dysuria and hematuria.   Musculoskeletal: Positive for back pain.   All other systems reviewed and are negative.    Physical Exam     Patient Vitals for the past 24 hrs:   BP Temp Temp src Pulse Heart Rate Resp SpO2 Weight   19 1315 121/80 -- -- 82 -- 14 96 % --   19 1300 122/74 -- -- 78 -- -- 94 % --   19 1245 121/66 --  -- 78 -- -- 94 % --   05/25/19 1230 123/67 -- -- 79 -- -- 92 % --   05/25/19 1215 130/68 -- -- 86 -- -- 91 % --   05/25/19 1200 129/71 -- -- 72 -- -- 93 % --   05/25/19 1145 131/73 -- -- 78 -- -- 95 % --   05/25/19 1130 134/74 -- -- 66 -- -- 95 % --   05/25/19 1100 (!) 141/98 -- -- 63 -- -- 98 % --   05/25/19 1052 132/86 -- -- 68 -- -- 100 % --   05/25/19 1034 -- 96.2  F (35.7  C) Oral -- 69 18 100 % 97.5 kg (215 lb)        Physical Exam  Gen:  Pleasant, appears stated age. Appears acutely uncomfortable, lying on left side.    Eye:   Pupils are equal, round, and reactive.     Sclera non-injected.    ENT:   Moist mucus membranes.     Normal tongue.    Oropharynx without lesions.    Cardiac:     Normal rate and regular rhythm.    No murmurs, gallops, or rubs.    Pulmonary:     Clear to auscultation bilaterally.    No wheezes, rales, or rhonchi.    Abdomen:     No CVA tenderness.   Normal active bowel sounds.     Abdomen is soft and non-distended.    : Penis: no lesions, no pus from urethral meatus   Scrotum: No hernia. No lesions. No scrotal tenderness to palpation.  Normal testicular lie.  No erythema.    Lower abdomen: No hernias noted.      Musculoskeletal:     Normal movement of all extremities without evidence for deficit.    Extremities:    No edema.    Skin:   Warm and dry.    Neurologic:    Non-focal exam without asymmetric weakness or numbness.    Normal tone    Psychiatric:     Normal affect with appropriate interaction with examiner.      Emergency Department Course     Imaging:  Radiographic findings were communicated with the patient who voiced understanding of the findings.    CT Abdomen Pelvis w/o Contrast  Impression: Proximal LEFT ureteral stone measuring 6 mm causes  upstream hydronephrosis and fat stranding surrounding the LEFT kidney.  Additional 1 mm nonobstructing stone is seen in the upper pole of the  LEFT kidney.  As read by Radiology.     Laboratory:  CBC: WNL (WBC 10.7, HGB 14.7, )    BMP: glucose 279, creatinine 1.49, GFR estimate 54, o/w WNL  UA with microscopic: urine glucose 300, urine blood moderate, RBC/HPF 78, mucous urine present, Sperm present, o/w WNL    Interventions:  1049: Zofran 4 mg, IV  1053: NS 1L IV Bolus   1059: Toradol 30 mg, IV  1100: Dilaudid 0.5 mg, IV  1318: Norco 5-325 mg, PO     Emergency Department Course:  Past medical records, nursing notes, and vitals reviewed.  1051: I performed an exam of the patient and obtained history, as documented above.  IV started and blood drawn for laboratory testing. Results are as above.    The patient was sent for CT imaging while in the emergency department, findings above.       1213: I rechecked the patient. Explained findings to the patient and wife.     I rechecked the patient. Findings and plan explained to the Patient. Patient discharged home with instructions regarding supportive care, medications, and reasons to return. The importance of close follow-up was reviewed.      Impression & Plan      Medical Decision Making:  Mukul Ascencio is a 49 year old male who presented with left-sided lateral flank & abdominal pain consistent with renal colic. CT confirms a 6 mm ureteral stone at the mid ureter.  Renal function shows mild renal insufficiency.  CT and lab workup show no other alternative etiology that could be causing his symptoms (e.g., AAA, appendicitis, pyelonephritis). There is no fever or convincing evidence of a urinary tract infection.  UA does demonstrate microalbuminuria.  We discussed admission for pain control and urology evaluation.  We discussed that there is low likelihood he will be able to pass the stone without intervention given its size.  At this point, the patient prefers to attempt outpatient management.  Given mild renal insufficiency, I recommended avoiding NSAID class medications.  I recommended follow-up with PCP in about a week to recheck urinalysis given microalbuminuria as well as renal  insufficiency.  On recheck, his pain is controlled with interventions in the ED and he is tolerating POs. I will prescribe supportive medications and Flomax to facilitate stone passage. I have advised him to return for uncontrolled pain, vomiting, fever, or any other concerning symptoms. I also advised to strain his urine to look for a stone and submit it to his primary doctor for lab analysis.  Finally, I have advised follow up with urology within 3-5 days.   Critical Care time:  none    Diagnosis:    ICD-10-CM    1. Kidney stone N20.0 UA with Microscopic   2. Microalbuminuria R80.9        Disposition:  discharged to home    Discharge Medications:     Medication List      Started    HYDROcodone-acetaminophen 5-325 MG tablet  Commonly known as:  NORCO  1 tablet, Oral, EVERY 6 HOURS PRN     ondansetron 4 MG ODT tab  Commonly known as:  ZOFRAN ODT  4 mg, Oral, EVERY 8 HOURS PRN     tamsulosin 0.4 MG capsule  Commonly known as:  FLOMAX  0.4 mg, Oral, DAILY          I, Megan Beh, am serving as a scribe at 10:51 AM on 5/25/2019 to document services personally performed by Josefina Waddell MD based on my observations and the provider's statements to me.      Megan Beh  5/25/2019    EMERGENCY DEPARTMENT       Josefina Waddell MD  05/25/19 0551

## 2019-05-28 ENCOUNTER — OFFICE VISIT (OUTPATIENT)
Dept: UROLOGY | Facility: CLINIC | Age: 49
End: 2019-05-28
Payer: COMMERCIAL

## 2019-05-28 VITALS
HEART RATE: 92 BPM | WEIGHT: 215 LBS | HEIGHT: 70 IN | DIASTOLIC BLOOD PRESSURE: 96 MMHG | SYSTOLIC BLOOD PRESSURE: 142 MMHG | BODY MASS INDEX: 30.78 KG/M2

## 2019-05-28 DIAGNOSIS — N20.0 KIDNEY STONE: Primary | ICD-10-CM

## 2019-05-28 RX ORDER — OXYCODONE HYDROCHLORIDE 5 MG/1
5 TABLET ORAL EVERY 6 HOURS PRN
Qty: 12 TABLET | Refills: 0 | Status: SHIPPED | OUTPATIENT
Start: 2019-05-28 | End: 2020-02-12

## 2019-05-28 RX ORDER — TAMSULOSIN HYDROCHLORIDE 0.4 MG/1
0.4 CAPSULE ORAL DAILY
Qty: 7 CAPSULE | Refills: 0 | Status: SHIPPED | OUTPATIENT
Start: 2019-05-28

## 2019-05-28 ASSESSMENT — ENCOUNTER SYMPTOMS
VOMITING: 1
HOARSE VOICE: 0
JAUNDICE: 0
WEIGHT LOSS: 1
POLYPHAGIA: 0
SINUS PAIN: 0
MUSCLE CRAMPS: 0
NECK MASS: 0
NAUSEA: 1
HALLUCINATIONS: 0
POLYDIPSIA: 0
INCREASED ENERGY: 1
ABDOMINAL PAIN: 1
TROUBLE SWALLOWING: 0
NECK PAIN: 0
CHILLS: 0
FEVER: 0
MUSCLE WEAKNESS: 0
JOINT SWELLING: 0
STIFFNESS: 0
FATIGUE: 0
DECREASED APPETITE: 1
RECTAL PAIN: 0
CONSTIPATION: 1
SINUS CONGESTION: 1
BLOATING: 1
NIGHT SWEATS: 0
HEARTBURN: 1
BLOOD IN STOOL: 0
SMELL DISTURBANCE: 0
ALTERED TEMPERATURE REGULATION: 0
TASTE DISTURBANCE: 0
BACK PAIN: 1
ARTHRALGIAS: 0
BOWEL INCONTINENCE: 0
DIARRHEA: 0
WEIGHT GAIN: 0
MYALGIAS: 1
SORE THROAT: 1

## 2019-05-28 ASSESSMENT — PAIN SCALES - GENERAL: PAINLEVEL: MODERATE PAIN (4)

## 2019-05-28 ASSESSMENT — MIFFLIN-ST. JEOR: SCORE: 1846.48

## 2019-05-28 NOTE — NURSING NOTE
New-Kidney Stones  Stone in 1993 and 2005 and was able to pass them both.  Left sided Flank pain/Left sided kidney today. pts is taking Narcotic Pain meds, last at 10 am    Chief Complaint   Patient presents with     New Patient     Kidney Stones       There were no vitals taken for this visit. There is no height or weight on file to calculate BMI.    Patient Active Problem List   Diagnosis     Lumbar radiculopathy     Acute left-sided low back pain with left-sided sciatica       Allergies   Allergen Reactions     Cefprozil GI Disturbance     Penicillins        Current Outpatient Medications   Medication Sig Dispense Refill     albuterol (2.5 MG/3ML) 0.083% neb solution In clinic 1 vial 0     albuterol (PROAIR HFA/PROVENTIL HFA/VENTOLIN HFA) 108 (90 BASE) MCG/ACT Inhaler Inhale 2 puffs into the lungs every 6 hours as needed for shortness of breath / dyspnea or wheezing 1 Inhaler 0     aspirin 81 MG tablet Take 81 mg by mouth daily       Atorvastatin Calcium (LIPITOR PO) Take 20 mg by mouth daily       cetirizine (ZYRTEC) 10 MG tablet Take 10 mg by mouth daily       HYDROcodone-acetaminophen (NORCO) 5-325 MG tablet Take 1 tablet by mouth every 6 hours as needed for severe pain 15 tablet 0     metFORMIN (GLUCOPHAGE) 500 MG tablet Take 500 mg by mouth 4 times daily       tamsulosin (FLOMAX) 0.4 MG capsule Take 1 capsule (0.4 mg) by mouth daily for 7 days 7 capsule 0     azithromycin (ZITHROMAX) 250 MG tablet Two tablets first day, then one tablet daily for four days. (Patient not taking: Reported on 5/28/2019) 6 tablet 0     fluticasone (FLONASE) 50 MCG/ACT spray Spray 1 spray into both nostrils daily       GLIMEPIRIDE PO Take 1 mg by mouth daily (with breakfast)        guaiFENesin-codeine (ROBITUSSIN AC) 100-10 MG/5ML SOLN solution Take 5 mLs by mouth every 4 hours as needed for cough (Patient not taking: Reported on 5/28/2019) 120 mL 0     ibuprofen (ADVIL/MOTRIN) 200 MG tablet Take 400 mg by mouth every 4 hours as  needed for mild pain       ondansetron (ZOFRAN ODT) 4 MG ODT tab Take 1 tablet (4 mg) by mouth every 8 hours as needed for nausea or vomiting (Patient not taking: Reported on 5/28/2019) 10 tablet 0     Btbuajawz-Thwfyinmv-HO-APAP (MUCINEX SINUS-MAX DAY/NIGHT PO) Take by mouth as needed         Social History     Tobacco Use     Smoking status: Never Smoker     Smokeless tobacco: Never Used   Substance Use Topics     Alcohol use: Yes     Drug use: None       Arpan Sepulveda, EMT  5/28/2019  12:38 PM

## 2019-05-28 NOTE — PROGRESS NOTES
Urology Clinic    Carlos Manuel Miller MD  Date of Service: 2019     Name: Mukul Ascencio  MRN: [id number]  Age: 49 year old  : 1970  Referring provider: Referred Self     Assessment and Plan:  Assessment:  Mukul Ascencio  is a 49 year old male with a 6 mm left ureteral stone with hydronephrosis and also a 1 mm left kidney stone.     Plan:    We discussed the nature of ureteral stones and obstruction.  We discussed spontaneous stone passage, medical expulsive therapy as well as stone removal.  The patient has ultimately decided to proceed with one week of a trial of passage.     Continue Flomax x 2 weeks     Dramamine 50 mg nightly for nausea     Ibuprofen 400-600 mg every 6 hours. He will contact his orthopedic surgeon and clear use of ibuprofen with them.     Tylenol 1000 mg every 6 hours     Norco should be used only for breakthrough pain. He has 3 tablets left and a small prescription of oxycodone was refilled.     He will follow up in 1 week with CT abdomen pelvis. He will continue straining his urine and if he passes the stone, he will follow up without the CT scan.     He is aware to return to the office/ER if he develops significant pain, inability to tolerate PO or signs of infection including fevers and chills.   ---------------------------------------------------------------------------------------------------------------------    Chief Complaint:   Kidney stone     HPI:   Mukul Ascencio  is a 49 year old male with a history of 2 prior kidney stones, both on the left side, which he passed. He had onset of left flank pain and nausea/vomiting on 2019. He was seen in the emergency department, where a CT scan revealed a 6 mm left ureteral stone, 1 mm nonobstructing left kidney stone, and left hydronephrosis. He developed hematuria yesterday but denies burning, urgency, fever, or chills. He has been taking Flomax and is now also having frequency. His pain is currently at a 3/10 in severity  and well managed on Norco. He has been taking 1 tablet at a time, except for yesterday when he took 2. He notes that he had a right rotator cuff repair 6 weeks ago and was told to avoid antiinflammatories for another month.     He is switching jobs in the near future and plans to travel to [place] for the 4th of July.     Stone History is as Follows:  First stone: 1993  Number of stone surgeries: 0  Number of stones passed spontaneously: 2, 1993 and 2005   History of UTI: no   Prior Stone Analysis: first stone was calcium oxalate   Family History Nephrolithasis: no  Stone Risk Factors: type 2 diabetes mellitus   Prior Metabolic Workup: n/a    Review of Systems:   Pertinent items are noted in HPI or as below, remainder of complete ROS is negative.      Active Medications:     Current Outpatient Medications:      albuterol (2.5 MG/3ML) 0.083% neb solution, In clinic, Disp: 1 vial, Rfl: 0     albuterol (PROAIR HFA/PROVENTIL HFA/VENTOLIN HFA) 108 (90 BASE) MCG/ACT Inhaler, Inhale 2 puffs into the lungs every 6 hours as needed for shortness of breath / dyspnea or wheezing, Disp: 1 Inhaler, Rfl: 0     aspirin 81 MG tablet, Take 81 mg by mouth daily, Disp: , Rfl:      Atorvastatin Calcium (LIPITOR PO), Take 20 mg by mouth daily, Disp: , Rfl:      cetirizine (ZYRTEC) 10 MG tablet, Take 10 mg by mouth daily, Disp: , Rfl:      HYDROcodone-acetaminophen (NORCO) 5-325 MG tablet, Take 1 tablet by mouth every 6 hours as needed for severe pain, Disp: 15 tablet, Rfl: 0     metFORMIN (GLUCOPHAGE) 500 MG tablet, Take 500 mg by mouth 4 times daily, Disp: , Rfl:      tamsulosin (FLOMAX) 0.4 MG capsule, Take 1 capsule (0.4 mg) by mouth daily for 7 days, Disp: 7 capsule, Rfl: 0     azithromycin (ZITHROMAX) 250 MG tablet, Two tablets first day, then one tablet daily for four days. (Patient not taking: Reported on 5/28/2019), Disp: 6 tablet, Rfl: 0     fluticasone (FLONASE) 50 MCG/ACT spray, Spray 1 spray into both nostrils daily, Disp: ,  "Rfl:      GLIMEPIRIDE PO, Take 1 mg by mouth daily (with breakfast) , Disp: , Rfl:      guaiFENesin-codeine (ROBITUSSIN AC) 100-10 MG/5ML SOLN solution, Take 5 mLs by mouth every 4 hours as needed for cough (Patient not taking: Reported on 5/28/2019), Disp: 120 mL, Rfl: 0     ibuprofen (ADVIL/MOTRIN) 200 MG tablet, Take 400 mg by mouth every 4 hours as needed for mild pain, Disp: , Rfl:      ondansetron (ZOFRAN ODT) 4 MG ODT tab, Take 1 tablet (4 mg) by mouth every 8 hours as needed for nausea or vomiting (Patient not taking: Reported on 5/28/2019), Disp: 10 tablet, Rfl: 0     Htbcnxnuk-Bxodfjimu-IO-APAP (MUCINEX SINUS-MAX DAY/NIGHT PO), Take by mouth as needed, Disp: , Rfl:       Allergies:   Cefprozil and Penicillins      Past Medical History:  PAUL   Hyperlipidemia   Kidney stone   Inguinal hernia   Lumbar radiculopathy   Type 2 diabetes mellitus     Past Surgical History:  Knee arthroscopy   Left inguinal hernia repair   Right rotator cuff repair     Family History:   Positive for hyperlipidemia, heart disease, lung cancer, and bladder cancer.       Social History:   No tobacco use.   Rare alcohol use.   He works in finance.     Physical Exam:   Patient is a 49 year old  male   Vitals: Blood pressure (!) 142/96, pulse 92, height 1.778 m (5' 10\"), weight 97.5 kg (215 lb).  General Appearance Adult: Alert, no acute distress, oriented  HENT: throat/mouth:normal, good dentition  Neck: No adenopathy,masses or thyromegaly  Lungs: no respiratory distress, or pursed lip breathing  Heart: No obvious jugular venous distension present  Abdomen: soft, nontender, no organomegaly or masses, Body mass index is 30.85 kg/m .  Lymphatics: No cervical or supraclavicular adenopathy  Musculoskeltal: extremities normal, no peripheral edema  Skin: no suspicious lesions or rashes  Neuro: Alert, oriented, speech and mentation normal  Psych: affect and mood normal  Gait: Normal  : deferred    Imaging:   I have personally reviewed the " results of the below imaging studies. The results were discussed with the patient.     Results for orders placed or performed during the hospital encounter of 05/25/19   CT Abdomen Pelvis w/o Contrast    Narrative    Exam: CT ABDOMEN PELVIS W/O CONTRAST  5/25/2019 11:26 AM    History: LEFT flank pain, abdominal pain, history of kidney stones.    Comparison: None    Technique: Volumetric acquisition with reconstruction in the axial,  coronal planes through the abdomen and pelvis without contrast.  Radiation dose for this scan was reduced using automated exposure  control, adjustment of the mA and/or kV according to patient size, or  iterative reconstruction technique.    Contrast: None    Findings:   Lung Bases: Lung bases are clear. No pleural or pericardial effusion.    Abdomen: Unenhanced liver, spleen, adrenal glands, pancreas and  gallbladder appear normal.    6 mm proximal LEFT ureteral stone with upstream hydronephrosis and fat  stranding surrounding the LEFT kidney. Additional 1 mm nonobstructing  stone is also seen in the upper pole of the LEFT kidney. No other  renal or ureteral calculi are seen. No RIGHT hydronephrosis or  hydroureter.    Colonic diverticulosis without signs of diverticulitis. No areas of  bowel wall thickening or bowel dilatation. Normal appendix. No free  fluid. No abdominal or pelvic lymphadenopathy.    Bones: No concerning lytic or sclerotic lesions.      Impression    Impression: Proximal LEFT ureteral stone measuring 6 mm causes  upstream hydronephrosis and fat stranding surrounding the LEFT kidney.  Additional 1 mm nonobstructing stone is seen in the upper pole of the  LEFT kidney.    DESHAWN ESCUDERO MD     Laboratory:   I personally reviewed all applicable laboratory data and went over findings with patient  Significant for:    CBC RESULTS:  Recent Labs   Lab Test 05/25/19  1050   WBC 10.7   HGB 14.7           BMP RESULTS:  Recent Labs   Lab Test 05/25/19  1050       POTASSIUM 4.2   CHLORIDE 99   CO2 27   ANIONGAP 8   *   BUN 21   CR 1.49*   GFRESTIMATED 54*   GFRESTBLACK 63   BRAXTON 9.2       UA RESULTS:   Recent Labs   Lab Test 05/25/19  1224   SG 1.018   URINEPH 6.5   NITRITE Negative   RBCU 78*   WBCU 5       CALCIUM RESULTS  Lab Results   Component Value Date    BRAXTON 9.2 05/25/2019     Scribe Disclosure:  Tammi DAVIS, am serving as a scribe to document services personally performed by Carlos Manuel Miller MD at this visit, based upon the provider's statements to me. All documentation has been reviewed by the aforementioned provider prior to being entered into the official medical record.     Answers for HPI/ROS submitted by the patient on 5/28/2019   General Symptoms: Yes  Skin Symptoms: No  HENT Symptoms: Yes  EYE SYMPTOMS: No  HEART SYMPTOMS: No  LUNG SYMPTOMS: No  INTESTINAL SYMPTOMS: Yes  URINARY SYMPTOMS: No  REPRODUCTIVE SYMPTOMS: No  SKELETAL SYMPTOMS: Yes  BLOOD SYMPTOMS: No  NERVOUS SYSTEM SYMPTOMS: No  MENTAL HEALTH SYMPTOMS: No  Fever: No  Loss of appetite: Yes  Weight loss: Yes  Weight gain: No  Fatigue: No  Night sweats: No  Chills: No  Increased stress: Yes  Excessive hunger: No  Excessive thirst: No  Feeling hot or cold when others believe the temperature is normal: No  Loss of height: No  Post-operative complications: No  Surgical site pain: Yes  Hallucinations: No  Change in or Loss of Energy: Yes  Hyperactivity: No  Confusion: No  Ear pain: No  Ear discharge: No  Hearing loss: No  Tinnitus: No  Nosebleeds: No  Congestion: Yes  Sinus pain: No  Trouble swallowing: No   Voice hoarseness: No  Mouth sores: No  Sore throat: Yes  Tooth pain: No  Gum tenderness: No  Bleeding gums: No  Change in taste: No  Change in sense of smell: No  Dry mouth: No  Hearing aid used: No  Neck lump: No  Heart burn or indigestion: Yes  Nausea: Yes  Vomiting: Yes  Abdominal pain: Yes  Bloating: Yes  Constipation: Yes  Diarrhea: No  Blood in stool: No  Black stools:  No  Rectal or Anal pain: No  Fecal incontinence: No  Yellowing of skin or eyes: No  Vomit with blood: No  Change in stools: Yes  Back pain: Yes  Muscle aches: Yes  Neck pain: No  Swollen joints: No  Joint pain: No  Bone pain: No  Muscle cramps: No  Muscle weakness: No  Joint stiffness: No  Bone fracture: No    Answers for HPI/ROS submitted by the patient on 5/28/2019   General Symptoms: Yes  Skin Symptoms: No  HENT Symptoms: Yes  EYE SYMPTOMS: No  HEART SYMPTOMS: No  LUNG SYMPTOMS: No  INTESTINAL SYMPTOMS: Yes  URINARY SYMPTOMS: No  REPRODUCTIVE SYMPTOMS: No  SKELETAL SYMPTOMS: Yes  BLOOD SYMPTOMS: No  NERVOUS SYSTEM SYMPTOMS: No  MENTAL HEALTH SYMPTOMS: No  Fever: No  Loss of appetite: Yes  Weight loss: Yes  Weight gain: No  Fatigue: No  Night sweats: No  Chills: No  Increased stress: Yes  Excessive hunger: No  Excessive thirst: No  Feeling hot or cold when others believe the temperature is normal: No  Loss of height: No  Post-operative complications: No  Surgical site pain: Yes  Hallucinations: No  Change in or Loss of Energy: Yes  Hyperactivity: No  Confusion: No  Ear pain: No  Ear discharge: No  Hearing loss: No  Tinnitus: No  Nosebleeds: No  Congestion: Yes  Sinus pain: No  Trouble swallowing: No   Voice hoarseness: No  Mouth sores: No  Sore throat: Yes  Tooth pain: No  Gum tenderness: No  Bleeding gums: No  Change in taste: No  Change in sense of smell: No  Dry mouth: No  Hearing aid used: No  Neck lump: No  Heart burn or indigestion: Yes  Nausea: Yes  Vomiting: Yes  Abdominal pain: Yes  Bloating: Yes  Constipation: Yes  Diarrhea: No  Blood in stool: No  Black stools: No  Rectal or Anal pain: No  Fecal incontinence: No  Yellowing of skin or eyes: No  Vomit with blood: No  Change in stools: Yes  Back pain: Yes  Muscle aches: Yes  Neck pain: No  Swollen joints: No  Joint pain: No  Bone pain: No  Muscle cramps: No  Muscle weakness: No  Joint stiffness: No  Bone fracture: No

## 2019-05-28 NOTE — PATIENT INSTRUCTIONS
Please follow up with Dr. Miller in one (1) week with a CT scan before your visit.    It was a pleasure meeting with you today.  Thank you for allowing me and my team the privilege of caring for you today.  YOU are the reason we are here, and I truly hope we provided you with the excellent service you deserve.  Please let us know if there is anything else we can do for you so that we can be sure you are leaving completely satisfied with your care experience.      Arpan Sepulveda

## 2019-05-28 NOTE — LETTER
2019       RE: Mukul Ascencio  [address] [facility] [address]     Dear Colleague,    Thank you for referring your patient, Mukul Ascencio, to the [facility] UROLOGY AND [facility] PROSTATE AND UROLOGIC CANCERS at [facility]. Please see a copy of my visit note below.      Urology Clinic    Carlos Manuel Miller MD  Date of Service: 2019     Name: Mukul Ascencio  MRN: [id number]  Age: 49 year old  : 1970  Referring provider: Referred Self     Assessment and Plan:  Assessment:  Mukul Ascencio  is a 49 year old male with a 6 mm left ureteral stone with hydronephrosis and also a 1 mm left kidney stone.     Plan:    We discussed the nature of ureteral stones and obstruction.  We discussed spontaneous stone passage, medical expulsive therapy as well as stone removal.  The patient has ultimately decided to proceed with one week of a trial of passage.     Continue Flomax x 2 weeks     Dramamine 50 mg nightly for nausea     Ibuprofen 400-600 mg every 6 hours. He will contact his orthopedic surgeon and clear use of ibuprofen with them.     Tylenol 1000 mg every 6 hours     Norco should be used only for breakthrough pain. He has 3 tablets left and a small prescription of oxycodone was refilled.     He will follow up in 1 week with CT abdomen pelvis. He will continue straining his urine and if he passes the stone, he will follow up without the CT scan.     He is aware to return to the office/ER if he develops significant pain, inability to tolerate PO or signs of infection including fevers and chills.   ---------------------------------------------------------------------------------------------------------------------    Chief Complaint:   Kidney stone     HPI:   Mukul Ascencio  is a 49 year old male with a history of 2 prior kidney stones, both on the left side, which he passed. He had onset of left flank pain and nausea/vomiting on 2019. He was seen in the  emergency department, where a CT scan revealed a 6 mm left ureteral stone, 1 mm nonobstructing left kidney stone, and left hydronephrosis. He developed hematuria yesterday but denies burning, urgency, fever, or chills. He has been taking Flomax and is now also having frequency. His pain is currently at a 3/10 in severity and well managed on Norco. He has been taking 1 tablet at a time, except for yesterday when he took 2. He notes that he had a right rotator cuff repair 6 weeks ago and was told to avoid antiinflammatories for another month.     He is switching jobs in the near future and plans to travel to [place] for the 4th of July.     Stone History is as Follows:  First stone: 1993  Number of stone surgeries: 0  Number of stones passed spontaneously: 2, 1993 and 2005   History of UTI: no   Prior Stone Analysis: first stone was calcium oxalate   Family History Nephrolithasis: no  Stone Risk Factors: type 2 diabetes mellitus   Prior Metabolic Workup: n/a    Review of Systems:   Pertinent items are noted in HPI or as below, remainder of complete ROS is negative.      Active Medications:     Current Outpatient Medications:      albuterol (2.5 MG/3ML) 0.083% neb solution, In clinic, Disp: 1 vial, Rfl: 0     albuterol (PROAIR HFA/PROVENTIL HFA/VENTOLIN HFA) 108 (90 BASE) MCG/ACT Inhaler, Inhale 2 puffs into the lungs every 6 hours as needed for shortness of breath / dyspnea or wheezing, Disp: 1 Inhaler, Rfl: 0     aspirin 81 MG tablet, Take 81 mg by mouth daily, Disp: , Rfl:      Atorvastatin Calcium (LIPITOR PO), Take 20 mg by mouth daily, Disp: , Rfl:      cetirizine (ZYRTEC) 10 MG tablet, Take 10 mg by mouth daily, Disp: , Rfl:      HYDROcodone-acetaminophen (NORCO) 5-325 MG tablet, Take 1 tablet by mouth every 6 hours as needed for severe pain, Disp: 15 tablet, Rfl: 0     metFORMIN (GLUCOPHAGE) 500 MG tablet, Take 500 mg by mouth 4 times daily, Disp: , Rfl:      tamsulosin (FLOMAX) 0.4 MG capsule, Take 1 capsule  "(0.4 mg) by mouth daily for 7 days, Disp: 7 capsule, Rfl: 0     azithromycin (ZITHROMAX) 250 MG tablet, Two tablets first day, then one tablet daily for four days. (Patient not taking: Reported on 5/28/2019), Disp: 6 tablet, Rfl: 0     fluticasone (FLONASE) 50 MCG/ACT spray, Spray 1 spray into both nostrils daily, Disp: , Rfl:      GLIMEPIRIDE PO, Take 1 mg by mouth daily (with breakfast) , Disp: , Rfl:      guaiFENesin-codeine (ROBITUSSIN AC) 100-10 MG/5ML SOLN solution, Take 5 mLs by mouth every 4 hours as needed for cough (Patient not taking: Reported on 5/28/2019), Disp: 120 mL, Rfl: 0     ibuprofen (ADVIL/MOTRIN) 200 MG tablet, Take 400 mg by mouth every 4 hours as needed for mild pain, Disp: , Rfl:      ondansetron (ZOFRAN ODT) 4 MG ODT tab, Take 1 tablet (4 mg) by mouth every 8 hours as needed for nausea or vomiting (Patient not taking: Reported on 5/28/2019), Disp: 10 tablet, Rfl: 0     Ntwubuxdq-Gjqrpkhfd-GV-APAP (MUCINEX SINUS-MAX DAY/NIGHT PO), Take by mouth as needed, Disp: , Rfl:       Allergies:   Cefprozil and Penicillins      Past Medical History:  PAUL   Hyperlipidemia   Kidney stone   Inguinal hernia   Lumbar radiculopathy   Type 2 diabetes mellitus     Past Surgical History:  Knee arthroscopy   Left inguinal hernia repair   Right rotator cuff repair     Family History:   Positive for hyperlipidemia, heart disease, lung cancer, and bladder cancer.       Social History:   No tobacco use.   Rare alcohol use.   He works in finance.     Physical Exam:   Patient is a 49 year old  male   Vitals: Blood pressure (!) 142/96, pulse 92, height 1.778 m (5' 10\"), weight 97.5 kg (215 lb).  General Appearance Adult: Alert, no acute distress, oriented  HENT: throat/mouth:normal, good dentition  Neck: No adenopathy,masses or thyromegaly  Lungs: no respiratory distress, or pursed lip breathing  Heart: No obvious jugular venous distension present  Abdomen: soft, nontender, no organomegaly or masses, Body mass index is " 30.85 kg/m .  Lymphatics: No cervical or supraclavicular adenopathy  Musculoskeltal: extremities normal, no peripheral edema  Skin: no suspicious lesions or rashes  Neuro: Alert, oriented, speech and mentation normal  Psych: affect and mood normal  Gait: Normal  : deferred    Imaging:   I have personally reviewed the results of the below imaging studies. The results were discussed with the patient.     Results for orders placed or performed during the hospital encounter of 05/25/19   CT Abdomen Pelvis w/o Contrast    Narrative    Exam: CT ABDOMEN PELVIS W/O CONTRAST  5/25/2019 11:26 AM    History: LEFT flank pain, abdominal pain, history of kidney stones.    Comparison: None    Technique: Volumetric acquisition with reconstruction in the axial,  coronal planes through the abdomen and pelvis without contrast.  Radiation dose for this scan was reduced using automated exposure  control, adjustment of the mA and/or kV according to patient size, or  iterative reconstruction technique.    Contrast: None    Findings:   Lung Bases: Lung bases are clear. No pleural or pericardial effusion.    Abdomen: Unenhanced liver, spleen, adrenal glands, pancreas and  gallbladder appear normal.    6 mm proximal LEFT ureteral stone with upstream hydronephrosis and fat  stranding surrounding the LEFT kidney. Additional 1 mm nonobstructing  stone is also seen in the upper pole of the LEFT kidney. No other  renal or ureteral calculi are seen. No RIGHT hydronephrosis or  hydroureter.    Colonic diverticulosis without signs of diverticulitis. No areas of  bowel wall thickening or bowel dilatation. Normal appendix. No free  fluid. No abdominal or pelvic lymphadenopathy.    Bones: No concerning lytic or sclerotic lesions.      Impression    Impression: Proximal LEFT ureteral stone measuring 6 mm causes  upstream hydronephrosis and fat stranding surrounding the LEFT kidney.  Additional 1 mm nonobstructing stone is seen in the upper pole of  the  LEFT kidney.    DESHAWN ESCUDERO MD     Laboratory:   I personally reviewed all applicable laboratory data and went over findings with patient  Significant for:    CBC RESULTS:  Recent Labs   Lab Test 05/25/19  1050   WBC 10.7   HGB 14.7           BMP RESULTS:  Recent Labs   Lab Test 05/25/19  1050      POTASSIUM 4.2   CHLORIDE 99   CO2 27   ANIONGAP 8   *   BUN 21   CR 1.49*   GFRESTIMATED 54*   GFRESTBLACK 63   BRAXTON 9.2       UA RESULTS:   Recent Labs   Lab Test 05/25/19  1224   SG 1.018   URINEPH 6.5   NITRITE Negative   RBCU 78*   WBCU 5       CALCIUM RESULTS  Lab Results   Component Value Date    BRAXTON 9.2 05/25/2019     Scribe Disclosure:  I, Tammi Wellington, am serving as a scribe to document services personally performed by Carlos Manuel Miller MD at this visit, based upon the provider's statements to me. All documentation has been reviewed by the aforementioned provider prior to being entered into the official medical record.     Answers for HPI/ROS submitted by the patient on 5/28/2019   General Symptoms: Yes  Skin Symptoms: No  HENT Symptoms: Yes  EYE SYMPTOMS: No  HEART SYMPTOMS: No  LUNG SYMPTOMS: No  INTESTINAL SYMPTOMS: Yes  URINARY SYMPTOMS: No  REPRODUCTIVE SYMPTOMS: No  SKELETAL SYMPTOMS: Yes  BLOOD SYMPTOMS: No  NERVOUS SYSTEM SYMPTOMS: No  MENTAL HEALTH SYMPTOMS: No  Fever: No  Loss of appetite: Yes  Weight loss: Yes  Weight gain: No  Fatigue: No  Night sweats: No  Chills: No  Increased stress: Yes  Excessive hunger: No  Excessive thirst: No  Feeling hot or cold when others believe the temperature is normal: No  Loss of height: No  Post-operative complications: No  Surgical site pain: Yes  Hallucinations: No  Change in or Loss of Energy: Yes  Hyperactivity: No  Confusion: No  Ear pain: No  Ear discharge: No  Hearing loss: No  Tinnitus: No  Nosebleeds: No  Congestion: Yes  Sinus pain: No  Trouble swallowing: No   Voice hoarseness: No  Mouth sores: No  Sore throat: Yes  Tooth  pain: No  Gum tenderness: No  Bleeding gums: No  Change in taste: No  Change in sense of smell: No  Dry mouth: No  Hearing aid used: No  Neck lump: No  Heart burn or indigestion: Yes  Nausea: Yes  Vomiting: Yes  Abdominal pain: Yes  Bloating: Yes  Constipation: Yes  Diarrhea: No  Blood in stool: No  Black stools: No  Rectal or Anal pain: No  Fecal incontinence: No  Yellowing of skin or eyes: No  Vomit with blood: No  Change in stools: Yes  Back pain: Yes  Muscle aches: Yes  Neck pain: No  Swollen joints: No  Joint pain: No  Bone pain: No  Muscle cramps: No  Muscle weakness: No  Joint stiffness: No  Bone fracture: No    Answers for HPI/ROS submitted by the patient on 5/28/2019   General Symptoms: Yes  Skin Symptoms: No  HENT Symptoms: Yes  EYE SYMPTOMS: No  HEART SYMPTOMS: No  LUNG SYMPTOMS: No  INTESTINAL SYMPTOMS: Yes  URINARY SYMPTOMS: No  REPRODUCTIVE SYMPTOMS: No  SKELETAL SYMPTOMS: Yes  BLOOD SYMPTOMS: No  NERVOUS SYSTEM SYMPTOMS: No  MENTAL HEALTH SYMPTOMS: No  Fever: No  Loss of appetite: Yes  Weight loss: Yes  Weight gain: No  Fatigue: No  Night sweats: No  Chills: No  Increased stress: Yes  Excessive hunger: No  Excessive thirst: No  Feeling hot or cold when others believe the temperature is normal: No  Loss of height: No  Post-operative complications: No  Surgical site pain: Yes  Hallucinations: No  Change in or Loss of Energy: Yes  Hyperactivity: No  Confusion: No  Ear pain: No  Ear discharge: No  Hearing loss: No  Tinnitus: No  Nosebleeds: No  Congestion: Yes  Sinus pain: No  Trouble swallowing: No   Voice hoarseness: No  Mouth sores: No  Sore throat: Yes  Tooth pain: No  Gum tenderness: No  Bleeding gums: No  Change in taste: No  Change in sense of smell: No  Dry mouth: No  Hearing aid used: No  Neck lump: No  Heart burn or indigestion: Yes  Nausea: Yes  Vomiting: Yes  Abdominal pain: Yes  Bloating: Yes  Constipation: Yes  Diarrhea: No  Blood in stool: No  Black stools: No  Rectal or Anal pain:  No  Fecal incontinence: No  Yellowing of skin or eyes: No  Vomit with blood: No  Change in stools: Yes  Back pain: Yes  Muscle aches: Yes  Neck pain: No  Swollen joints: No  Joint pain: No  Bone pain: No  Muscle cramps: No  Muscle weakness: No  Joint stiffness: No  Bone fracture: No      Again, thank you for allowing me to participate in the care of your patient.      Sincerely,    Carlos Manuel Miller MD

## 2019-05-31 ENCOUNTER — TELEPHONE (OUTPATIENT)
Dept: UROLOGY | Facility: CLINIC | Age: 49
End: 2019-05-31

## 2019-05-31 ENCOUNTER — PRE VISIT (OUTPATIENT)
Dept: UROLOGY | Facility: CLINIC | Age: 49
End: 2019-05-31

## 2019-05-31 NOTE — TELEPHONE ENCOUNTER
[facility] Call Center    Phone Message    May a detailed message be left on voicemail: yes    Reason for Call: Other: Rosita calling to request a prior auth for oxyCODONE (ROXICODONE) 5 MG tablet. Fax# [phone]. Please call her back with any questions.      Action Taken: Message routed to:  [facility] ([facility]): nathaniel lei

## 2019-05-31 NOTE — TELEPHONE ENCOUNTER
Prior Authorization Retail Medication Request    Medication/Dose: oxycodone  ICD code (if different than what is on RX):  For kidney stone pain  active  Previously Tried and Failed:  Only has gotten this medication  Rationale:  He needs prior auth because of getting several scripts for this pain med        ATTENTion  This is a prior auth due to how many of this medication he has had in the last 2 months please make it for 5/31 we are giving them the 12 to pay for and hopefully get the ok for this patient from his insurance         Insurance Name:  Preferred [employer]  Insurance ID:  [id number]      Pharmacy Information (if different than what is on RX)  [facility]  Phone:  [phone]

## 2019-05-31 NOTE — TELEPHONE ENCOUNTER
Called pharmacy patient has had 3 refills since April with oxycodone due to active kidney stones.  I sent thru prior auth because we are only giving him 12 at this time for the first time. Prior auth sent aNtalia Maddox LPN Staff Nurse

## 2019-06-03 NOTE — TELEPHONE ENCOUNTER
Central Prior Authorization Team   Phone: [phone]    PA Initiation - Your Tracking Number is [id number]    Medication: oxyCODONE (ROXICODONE) 5 MG tablet  Insurance Company: [employer] - Phone [phone] Fax [phone]  Pharmacy Filling the Rx: [id number] [place] - [address]  Filling Pharmacy Phone: [phone]  Filling Pharmacy Fax:    Start Date: 6/3/2019

## 2019-06-05 NOTE — TELEPHONE ENCOUNTER
PRIOR AUTHORIZATION DENIED    Medication: oxyCODONE (ROXICODONE) 5 MG tablet - P/A DENIED    Denial Date: 6/5/2019    Denial Rational:         Appeal Information:

## 2020-02-12 ENCOUNTER — OFFICE VISIT (OUTPATIENT)
Dept: URGENT CARE | Facility: URGENT CARE | Age: 50
End: 2020-02-12
Payer: COMMERCIAL

## 2020-02-12 VITALS
BODY MASS INDEX: 30.85 KG/M2 | RESPIRATION RATE: 16 BRPM | HEART RATE: 82 BPM | WEIGHT: 215 LBS | DIASTOLIC BLOOD PRESSURE: 90 MMHG | OXYGEN SATURATION: 99 % | TEMPERATURE: 97.4 F | SYSTOLIC BLOOD PRESSURE: 145 MMHG

## 2020-02-12 DIAGNOSIS — R05.8 PRODUCTIVE COUGH: ICD-10-CM

## 2020-02-12 PROBLEM — M54.42 ACUTE LEFT-SIDED LOW BACK PAIN WITH LEFT-SIDED SCIATICA: Status: RESOLVED | Noted: 2017-01-04 | Resolved: 2020-02-12

## 2020-02-12 PROCEDURE — 99213 OFFICE O/P EST LOW 20 MIN: CPT | Performed by: FAMILY MEDICINE

## 2020-02-12 RX ORDER — AZITHROMYCIN 250 MG/1
TABLET, FILM COATED ORAL
Qty: 6 TABLET | Refills: 0 | Status: SHIPPED | OUTPATIENT
Start: 2020-02-12 | End: 2020-02-17

## 2020-02-12 RX ORDER — CODEINE PHOSPHATE AND GUAIFENESIN 10; 100 MG/5ML; MG/5ML
1 SOLUTION ORAL EVERY 6 HOURS PRN
Qty: 240 ML | Refills: 0 | Status: SHIPPED | OUTPATIENT
Start: 2020-02-12

## 2020-02-13 NOTE — PROGRESS NOTES
CHIEF COMPLAINT:   Chief Complaint   Patient presents with     URI     cough and chest congestion, fatigue X 2 weeks      SUBJECTIVE:  Mukul Ascencio is a 49 year old male who presents to the clinic today with a chief complaint of cough  for 2 week(s).  His cough is described as persistent and nonproductive.    The patient's symptoms are moderate and stable.  Associated symptoms include nasal congestion. The patient's symptoms are exacerbated by no particular triggers  Patient has been using OTC cough suppressants  to improve symptoms.    No past medical history on file.    Current Outpatient Medications   Medication Sig Dispense Refill     albuterol (2.5 MG/3ML) 0.083% neb solution In clinic 1 vial 0     albuterol (PROAIR HFA/PROVENTIL HFA/VENTOLIN HFA) 108 (90 BASE) MCG/ACT Inhaler Inhale 2 puffs into the lungs every 6 hours as needed for shortness of breath / dyspnea or wheezing 1 Inhaler 0     aspirin 81 MG tablet Take 81 mg by mouth daily       Atorvastatin Calcium (LIPITOR PO) Take 20 mg by mouth daily       cetirizine (ZYRTEC) 10 MG tablet Take 10 mg by mouth daily       fluticasone (FLONASE) 50 MCG/ACT spray Spray 1 spray into both nostrils daily       GLIMEPIRIDE PO Take 1 mg by mouth daily (with breakfast)        guaiFENesin-codeine (ROBITUSSIN AC) 100-10 MG/5ML SOLN solution Take 5 mLs by mouth every 4 hours as needed for cough 120 mL 0     ibuprofen (ADVIL/MOTRIN) 200 MG tablet Take 400 mg by mouth every 4 hours as needed for mild pain       metFORMIN (GLUCOPHAGE) 500 MG tablet Take 500 mg by mouth 4 times daily       oxyCODONE (ROXICODONE) 5 MG tablet Take 1 tablet (5 mg) by mouth every 6 hours as needed for severe pain 12 tablet 0     Hirnytdss-Imgtwrvkk-ZR-APAP (MUCINEX SINUS-MAX DAY/NIGHT PO) Take by mouth as needed       tamsulosin (FLOMAX) 0.4 MG capsule Take 1 capsule (0.4 mg) by mouth daily 7 capsule 0       Social History     Tobacco Use     Smoking status: Never Smoker     Smokeless tobacco:  Never Used   Substance Use Topics     Alcohol use: Yes       ROS  INTEGUMENTARY/SKIN: NEGATIVE for worrisome rashes, moles or lesions  EYES: NEGATIVE for vision changes or irritation    OBJECTIVE:  BP (!) 145/90   Pulse 82   Temp 97.4  F (36.3  C) (Oral)   Resp 16   Wt 97.5 kg (215 lb)   SpO2 99%   BMI 30.85 kg/m    GENERAL APPEARANCE: healthy, alert and no distress  EYES: EOMI,  PERRL, conjunctiva clear  HENT: ear canals and TM's normal.  Nose and mouth without ulcers, erythema or lesions  NECK: supple, nontender, no lymphadenopathy  RESP: lungs clear to auscultation - no rales, rhonchi or wheezes  CV: regular rates and rhythm, normal S1 S2, no murmur noted  NEURO: Normal strength and tone, sensory exam grossly normal,  normal speech and mentation  SKIN: no suspicious lesions or rashes    ASSESSMENT:    1. Productive cough  Symptomatic cares were discussed in detail.   Pt instructed to come back to the clinic for worsening sx    - guaiFENesin-codeine (ROBITUSSIN AC) 100-10 MG/5ML solution; Take 5 mLs by mouth every 6 hours as needed for cough  Dispense: 240 mL; Refill: 0  - azithromycin (ZITHROMAX) 250 MG tablet; Take 2 tablets (500 mg) by mouth daily for 1 day, THEN 1 tablet (250 mg) daily for 4 days.  Dispense: 6 tablet; Refill: 0

## 2021-08-03 ENCOUNTER — TRANSFERRED RECORDS (OUTPATIENT)
Dept: HEALTH INFORMATION MANAGEMENT | Facility: CLINIC | Age: 51
End: 2021-08-03